# Patient Record
Sex: FEMALE | Race: ASIAN | NOT HISPANIC OR LATINO | Employment: FULL TIME | URBAN - METROPOLITAN AREA
[De-identification: names, ages, dates, MRNs, and addresses within clinical notes are randomized per-mention and may not be internally consistent; named-entity substitution may affect disease eponyms.]

---
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
## 2018-01-07 ENCOUNTER — HEALTH MAINTENANCE LETTER (OUTPATIENT)
Age: 18
End: 2018-01-07

## 2018-06-22 ENCOUNTER — OFFICE VISIT (OUTPATIENT)
Dept: OBGYN | Facility: CLINIC | Age: 18
End: 2018-06-22
Payer: COMMERCIAL

## 2018-06-22 VITALS
WEIGHT: 117 LBS | TEMPERATURE: 98.8 F | HEIGHT: 63 IN | BODY MASS INDEX: 20.73 KG/M2 | OXYGEN SATURATION: 100 % | DIASTOLIC BLOOD PRESSURE: 44 MMHG | SYSTOLIC BLOOD PRESSURE: 96 MMHG | HEART RATE: 61 BPM

## 2018-06-22 DIAGNOSIS — Z30.011 ENCOUNTER FOR INITIAL PRESCRIPTION OF CONTRACEPTIVE PILLS: Primary | ICD-10-CM

## 2018-06-22 PROCEDURE — 99213 OFFICE O/P EST LOW 20 MIN: CPT | Performed by: NURSE PRACTITIONER

## 2018-06-22 RX ORDER — DESOGESTREL AND ETHINYL ESTRADIOL 0.15-0.03
1 KIT ORAL DAILY
Qty: 84 TABLET | Refills: 3 | Status: SHIPPED | OUTPATIENT
Start: 2018-06-22 | End: 2019-07-10

## 2018-06-22 ASSESSMENT — PAIN SCALES - GENERAL: PAINLEVEL: NO PAIN (0)

## 2018-06-22 NOTE — PROGRESS NOTES
"  SUBJECTIVE:   Elzbieta Price is a 18 year old female who presents to clinic today for the following health issues:    Birth control counseling  Patient is currently using condoms for contraception. Patient would like to discuss available options for something additional that may be more reliable. Past medical, surgical, social, and family history are reviewed in chart and updated along with current medications and allergies. No contraindications to hormonal contraception. Declines STI screening or full preventative exam.     Problem list and histories reviewed & adjusted, as indicated.  Additional history: as documented    There is no problem list on file for this patient.    Past Surgical History:   Procedure Laterality Date     NO HISTORY OF SURGERY         Social History   Substance Use Topics     Smoking status: Never Smoker     Smokeless tobacco: Never Used     Alcohol use No     History reviewed. No pertinent family history.        Reviewed and updated as needed this visit by clinical staff  Tobacco  Allergies  Meds  Med Hx  Surg Hx  Fam Hx  Soc Hx      Reviewed and updated as needed this visit by Provider         ROS:  Constitutional, HEENT, cardiovascular, pulmonary, gi and gu systems are negative, except as otherwise noted.    OBJECTIVE:     BP 96/44  Pulse 61  Temp 98.8  F (37.1  C) (Oral)  Ht 5' 2.75\" (1.594 m)  Wt 117 lb (53.1 kg)  LMP 05/25/2018 (Approximate)  SpO2 100%  BMI 20.89 kg/m2  Body mass index is 20.89 kg/(m^2).  GENERAL: healthy, alert and no distress  RESP: lungs clear to auscultation - no rales, rhonchi or wheezes  CV: regular rate and rhythm, normal S1 S2, no S3 or S4, no murmur, click or rub, no peripheral edema and peripheral pulses strong  ABDOMEN: soft, nontender, no hepatosplenomegaly, no masses and bowel sounds normal  MS: no gross musculoskeletal defects noted, no edema  SKIN: no suspicious lesions or rashes  PSYCH: mentation appears normal, affect " normal/bright    ASSESSMENT/PLAN:   1. Encounter for initial prescription of contraceptive pills  Discussed options for contraception with patient including oral contraceptive pills, transdermal patches, vaginal ring, Depo Provera, Nexplanon. At this time she would like to try an oral contraceptive pill. We discussed when to start the pill, taking it at the same time every day, possible side effects she may experience, and use of barrier method to protect against STDs. Prescription sent. Plan pap smear and AFE in 1 year.  - desogestrel-ethinyl estradiol (APRI) 0.15-30 MG-MCG per tablet; Take 1 tablet by mouth daily  Dispense: 84 tablet; Refill: 3    RHONDA [facility]

## 2018-06-22 NOTE — MR AVS SNAPSHOT
"              After Visit Summary   6/22/2018    Elzbieta Price    MRN: [id number]           Patient Information     Date Of Birth          2000        Visit Information        Provider Department      6/22/2018 10:10 AM Lisa Parker APRN CNP [facility]        Today's Diagnoses     Encounter for initial prescription of contraceptive pills    -  1       Follow-ups after your visit        Who to contact     If you have questions or need follow up information about today's clinic visit or your schedule please contact [facility] directly at [phone].  Normal or non-critical lab and imaging results will be communicated to you by [employer]hart, letter or phone within 4 business days after the clinic has received the results. If you do not hear from us within 7 days, please contact the clinic through 2d2ct or phone. If you have a critical or abnormal lab result, we will notify you by phone as soon as possible.  Submit refill requests through WellnessFX or call your pharmacy and they will forward the refill request to us. Please allow 3 business days for your refill to be completed.          Additional Information About Your Visit        MyChart Information     WellnessFX gives you secure access to your electronic health record. If you see a primary care provider, you can also send messages to your care team and make appointments. If you have questions, please call your primary care clinic.  If you do not have a primary care provider, please call [phone] and they will assist you.        Care EveryWhere ID     This is your Care EveryWhere ID. This could be used by other organizations to access your [place] medical records  [id number]        Your Vitals Were     Pulse Temperature Height Last Period Pulse Oximetry BMI (Body Mass Index)    61 98.8  F (37.1  C) (Oral) 5' 2.75\" (1.594 m) 05/25/2018 (Approximate) 100% 20.89 kg/m2       Blood Pressure from Last 3 Encounters:   06/22/18 96/44 "   01/30/17 120/81   09/30/16 111/71    Weight from Last 3 Encounters:   06/22/18 117 lb (53.1 kg)   01/30/17 118 lb 11.2 oz (53.8 kg) (36 %)*   09/30/16 117 lb (53.1 kg) (34 %)*     * Growth percentiles are based on [facility] 2-20 Years data.              Today, you had the following     No orders found for display         Today's Medication Changes          These changes are accurate as of 6/22/18 10:16 AM.  If you have any questions, ask your nurse or doctor.               Start taking these medicines.        Dose/Directions    desogestrel-ethinyl estradiol 0.15-30 MG-MCG per tablet   Commonly known as:  APRI   Used for:  Encounter for initial prescription of contraceptive pills   Started by:  Lisa Parker APRN CNP        Dose:  1 tablet   Take 1 tablet by mouth daily   Quantity:  84 tablet   Refills:  3            Where to get your medicines      These medications were sent to [facility] [address] [place] [address] AT SEC of [address] [address]     Phone:  [phone]     desogestrel-ethinyl estradiol 0.15-30 MG-MCG per tablet                Primary Care Provider Office Phone # Fax #    [facility] [phone]       [address]  [facility] [address]        Equal Access to Services     [facility] AH: Hadii aad ku hadasho Sosoniali, waaxda luqadaha, qaybta kaalmada adeegyada, jennifer orellana. So [facility] [phone].    ATENCIÓN: Si habla español, tiene a aguilar disposición servicios gratuitos de asistencia lingüística. Rocio al [phone].    We comply with applicable federal civil rights laws and [place] laws. We do not discriminate on the basis of race, color, national origin, age, disability, sex, sexual orientation, or gender identity.            Thank you!     Thank you for choosing [facility]  for your care. Our goal is always to provide you with  excellent care. Hearing back from our patients is one way we can continue to improve our services. Please take a few minutes to complete the written survey that you may receive in the mail after your visit with us. Thank you!             Your Updated Medication List - Protect others around you: Learn how to safely use, store and throw away your medicines at www.disposemymeds.org.          This list is accurate as of 6/22/18 10:16 AM.  Always use your most recent med list.                   Brand Name Dispense Instructions for use Diagnosis    desogestrel-ethinyl estradiol 0.15-30 MG-MCG per tablet    APRI    84 tablet    Take 1 tablet by mouth daily    Encounter for initial prescription of contraceptive pills

## 2018-06-22 NOTE — PROGRESS NOTES
"   SUBJECTIVE:   CC: Elzbieta Price is an 18 year old woman who presents for preventive health visit.     Physical   Annual:     Getting at least 3 servings of Calcium per day::  Yes    Bi-annual eye exam::  Yes    Dental care twice a year::  NO    Sleep apnea or symptoms of sleep apnea::  None    Diet::  Regular (no restrictions)    Frequency of exercise::  None    Taking medications regularly::  Yes    Medication side effects::  None    Additional concerns today::  No            {Outside tests to abstract? :733597}    {additional problems to add (Optional):503426}    Today's PHQ-2 Score:   PHQ-2 ( 1999 [employer]) 6/21/2018   Q1: Little interest or pleasure in doing things 0   Q2: Feeling down, depressed or hopeless 0   PHQ-2 Score 0   Q1: Little interest or pleasure in doing things Not at all   Q2: Feeling down, depressed or hopeless Not at all   PHQ-2 Score 0       Abuse: Current or Past(Physical, Sexual or Emotional)- {YES/NO/NA:398792}  Do you feel safe in your environment - {YES/NO/NA:778431}    Social History   Substance Use Topics     Smoking status: Never Smoker     Smokeless tobacco: Never Used     Alcohol use No     Alcohol Use 6/21/2018   If you drink alcohol do you typically have greater than 3 drinks per day OR greater than 7 drinks per week? No   {add AUDIT responses (Optional) (A score of 7 for adult men is an indication of hazardous drinking; a score of 8 or more is an indication of an alcohol use disorder.  A score of 7 or more for adult women is an indication of hazardous drinking or an alchohol use disorder):293917}    Reviewed orders with patient.  Reviewed health maintenance and updated orders accordingly - {Yes/No:815625::\"Yes\"}  {Chronicprobdata (Optional):422689}    {Mammo Decision Support (Optional):109834}    Pertinent mammograms are reviewed under the imaging tab.  History of abnormal Pap smear: {PAP HX:254476}    Reviewed and updated as needed this visit by clinical staff         Reviewed and " "updated as needed this visit by Provider        {HISTORY OPTIONS (Optional):215914}    Review of Systems  {FEMALE PREVENTATIVE ROS:133691}     OBJECTIVE:   There were no vitals taken for this visit.  Physical Exam  {Exam Choices:521916}    ASSESSMENT/PLAN:   {Diag Picklist:666765}    COUNSELING:  {FEMALE COUNSELING MESSAGES:886651::\"Reviewed preventive health counseling, as reflected in patient instructions\"}    {BP Counseling- Complete if BP >= 120/80  (Optional):277347}     reports that she has never smoked. She has never used smokeless tobacco.  {Tobacco Cessation -- Complete if patient is a smoker (Optional):177772}  Estimated body mass index is 21.36 kg/(m^2) as calculated from the following:    Height as of 1/30/17: 5' 2.5\" (1.588 m).    Weight as of 1/30/17: 118 lb 11.2 oz (53.8 kg).   {Weight Management Plan (ACO) Complete if BMI is abnormal-  Ages 18-64  BMI >24.9.  Age 65+ with BMI <23 or >30 (Optional):023804}    Counseling Resources:  ATP IV Guidelines  Pooled Cohorts Equation Calculator  Breast Cancer Risk Calculator  FRAX Risk Assessment  ICSI Preventive Guidelines  Dietary Guidelines for Americans, 2010  USDA's MyPlate  ASA Prophylaxis  Lung CA Screening    RHONDA Suero [facility]  "

## 2018-06-22 NOTE — NURSING NOTE
"Chief Complaint   Patient presents with     Contraception       Initial BP 96/44  Pulse 61  Temp 98.8  F (37.1  C) (Oral)  Ht 5' 2.75\" (1.594 m)  Wt 117 lb (53.1 kg)  LMP 05/25/2018 (Approximate)  SpO2 100%  BMI 20.89 kg/m2 Estimated body mass index is 20.89 kg/(m^2) as calculated from the following:    Height as of this encounter: 5' 2.75\" (1.594 m).    Weight as of this encounter: 117 lb (53.1 kg)..  BP completed using cuff size: elias Mckeon CMA    "

## 2018-11-30 ENCOUNTER — ALLIED HEALTH/NURSE VISIT (OUTPATIENT)
Dept: NURSING | Facility: CLINIC | Age: 18
End: 2018-11-30
Payer: COMMERCIAL

## 2018-11-30 DIAGNOSIS — Z23 NEED FOR PROPHYLACTIC VACCINATION AND INOCULATION AGAINST INFLUENZA: Primary | ICD-10-CM

## 2018-11-30 PROCEDURE — 90471 IMMUNIZATION ADMIN: CPT

## 2018-11-30 PROCEDURE — 90686 IIV4 VACC NO PRSV 0.5 ML IM: CPT

## 2018-11-30 PROCEDURE — 99207 ZZC NO CHARGE NURSE ONLY: CPT

## 2018-11-30 NOTE — PROGRESS NOTES

## 2018-11-30 NOTE — MR AVS SNAPSHOT
After Visit Summary   11/30/2018    Elzbieta Price    MRN: [id number]           Patient Information     Date Of Birth          2000        Visit Information        Provider Department      11/30/2018 12:40 PM AN ANCILLARY [facility]        Today's Diagnoses     Need for prophylactic vaccination and inoculation against influenza    -  1       Follow-ups after your visit        Who to contact     If you have questions or need follow up information about today's clinic visit or your schedule please contact [facility] directly at [phone].  Normal or non-critical lab and imaging results will be communicated to you by [employer], letter or phone within 4 business days after the clinic has received the results. If you do not hear from us within 7 days, please contact the clinic through [employer] or phone. If you have a critical or abnormal lab result, we will notify you by phone as soon as possible.  Submit refill requests through [employer] or call your pharmacy and they will forward the refill request to us. Please allow 3 business days for your refill to be completed.          Additional Information About Your Visit        MyChart Information     [employer] gives you secure access to your electronic health record. If you see a primary care provider, you can also send messages to your care team and make appointments. If you have questions, please call your primary care clinic.  If you do not have a primary care provider, please call [phone] and they will assist you.        Care EveryWhere ID     This is your Care EveryWhere ID. This could be used by other organizations to access your [place] medical records  [id number]         Blood Pressure from Last 3 Encounters:   06/22/18 96/44   01/30/17 120/81   09/30/16 111/71    Weight from Last 3 Encounters:   06/22/18 117 lb (53.1 kg)   01/30/17 118 lb 11.2 oz (53.8 kg) (36 %)*   09/30/16 117 lb (53.1 kg) (34 %)*     * Growth percentiles  are based on [facility] 2-20 Years data.              We Performed the Following     FLU VACCINE, SPLIT VIRUS, IM (QUADRIVALENT) [50425]- >3 YRS     Vaccine Administration, Initial [26094]        Primary Care Provider Office Phone # Fax #    [facility] [phone] [address]  [facility] [address]        Equal Access to Services     MARY MADISON : Hadii aad ku hadasho Soomaali, waaxda luqadaha, qaybta kaalmada adeegyada, waxay idiin hayaan adeeg kharash lasylvia . So [facility] [phone].    ATENCIÓN: Si habla español, tiene a aguilar disposición servicios gratuitos de asistencia lingüística. Rocio al [phone].    We comply with applicable federal civil rights laws and [place] laws. We do not discriminate on the basis of race, color, national origin, age, disability, sex, sexual orientation, or gender identity.            Thank you!     Thank you for choosing [facility]  for your care. Our goal is always to provide you with excellent care. Hearing back from our patients is one way we can continue to improve our services. Please take a few minutes to complete the written survey that you may receive in the mail after your visit with us. Thank you!             Your Updated Medication List - Protect others around you: Learn how to safely use, store and throw away your medicines at www.disposemymeds.org.          This list is accurate as of 11/30/18 12:45 PM.  Always use your most recent med list.                   Brand Name Dispense Instructions for use Diagnosis    desogestrel-ethinyl estradiol 0.15-30 MG-MCG tablet    APRI    84 tablet    Take 1 tablet by mouth daily    Encounter for initial prescription of contraceptive pills

## 2019-01-29 NOTE — PROGRESS NOTES
"  SUBJECTIVE:   Elzbieta Price is a 18 year old female who presents to clinic today for the following health issues:    Irregular Menses    Patient is on a combined oral contraceptive pill. In her last cycle, did miss one pill and took it the next day. Menses was due last week. Monday-Thursday had spotting (which is not normal for her) of darker old blood. Thursday, had slight increase in cramping starting from when she woke up. In the afternoon, was urinating and blood was bright red with wiping and noticed something in the toilet, picked it out and it was hard, pink to tan in color. Approximately the size of a quarter. After that again blood was spotting and dark in color. Started her new pack of pills Sunday as normal. No further bleeding.  2 negative home pregnancy tests. Confident she is not pregnant. No abnormal urinary or vaginal symptoms, pelvic pain, fevers, nausea, back pain. No STI concern.    Problem list and histories reviewed & adjusted, as indicated.  Additional history: as documented    There is no problem list on file for this patient.    Past Surgical History:   Procedure Laterality Date     NO HISTORY OF SURGERY         Social History     Tobacco Use     Smoking status: Never Smoker     Smokeless tobacco: Never Used   Substance Use Topics     Alcohol use: No     History reviewed. No pertinent family history.        Reviewed and updated as needed this visit by clinical staff       Reviewed and updated as needed this visit by Provider         ROS:  Constitutional, HEENT, cardiovascular, pulmonary, gi and gu systems are negative, except as otherwise noted.    OBJECTIVE:     /75 (BP Location: Right arm, Patient Position: Sitting, Cuff Size: Adult Regular)   Pulse 76   Temp 97.3  F (36.3  C) (Oral)   Ht 1.594 m (5' 2.75\")   Wt 53.7 kg (118 lb 6.4 oz)   LMP 01/21/2019 (Exact Date)   SpO2 99%   BMI 21.14 kg/m    Body mass index is 21.14 kg/m .  GENERAL: healthy, alert and no distress  RESP: lungs " clear to auscultation - no rales, rhonchi or wheezes  CV: regular rate and rhythm, normal S1 S2, no S3 or S4, no murmur, click or rub, no peripheral edema and peripheral pulses strong  ABDOMEN: soft, nontender, no hepatosplenomegaly, no masses and bowel sounds normal   (female): normal female external genitalia, normal urethral meatus, vaginal mucosa, normal cervix/adnexa/uterus without masses or discharge  MS: no gross musculoskeletal defects noted, no edema  SKIN: no suspicious lesions or rashes  PSYCH: mentation appears normal, affect normal/bright    ASSESSMENT/PLAN:   1. Irregular menstrual cycle  We discussed her lack of a withdrawal bleed, but did have spotting and discussed possible etiologies of the tissue/clot she passed. Reassured by the normal exam. Declines STI or pregnancy testing. Will continue pills as prescribed and patient will monitor her next few cycles. If she passes more tissue/clots, will call me and plan pelvic ultrasound for further evaluation. Discussed that absence of a withdrawal bleed is not necessarily abnormal while on oral contraceptive pills. Patient is given an opportunity to ask questions and have them answered.    RHONDA Suero [facility]

## 2019-01-30 ENCOUNTER — OFFICE VISIT (OUTPATIENT)
Dept: OBGYN | Facility: CLINIC | Age: 19
End: 2019-01-30
Payer: COMMERCIAL

## 2019-01-30 VITALS
OXYGEN SATURATION: 99 % | HEART RATE: 76 BPM | BODY MASS INDEX: 20.98 KG/M2 | TEMPERATURE: 97.3 F | WEIGHT: 118.4 LBS | DIASTOLIC BLOOD PRESSURE: 75 MMHG | SYSTOLIC BLOOD PRESSURE: 120 MMHG | HEIGHT: 63 IN

## 2019-01-30 DIAGNOSIS — N92.6 IRREGULAR MENSTRUAL CYCLE: ICD-10-CM

## 2019-01-30 PROCEDURE — 99213 OFFICE O/P EST LOW 20 MIN: CPT | Performed by: NURSE PRACTITIONER

## 2019-01-30 ASSESSMENT — PAIN SCALES - GENERAL: PAINLEVEL: NO PAIN (0)

## 2019-01-30 ASSESSMENT — MIFFLIN-ST. JEOR: SCORE: 1272.22

## 2019-07-10 ENCOUNTER — MYC REFILL (OUTPATIENT)
Dept: OBGYN | Facility: CLINIC | Age: 19
End: 2019-07-10

## 2019-07-10 DIAGNOSIS — Z30.011 ENCOUNTER FOR INITIAL PRESCRIPTION OF CONTRACEPTIVE PILLS: ICD-10-CM

## 2019-07-11 RX ORDER — DESOGESTREL AND ETHINYL ESTRADIOL 0.15-0.03
1 KIT ORAL DAILY
Qty: 84 TABLET | Refills: 1 | Status: SHIPPED | OUTPATIENT
Start: 2019-07-11 | End: 2019-12-06

## 2019-07-11 NOTE — TELEPHONE ENCOUNTER
"Contraceptives Protocol Passed   desogestrel-ethinyl estradiol (APRI) 0.15-30 MG-MCG tablet   Rerun Protocol (7/10/2019 9:15 PM)      Patient is not a current smoker if age is 35 or older         Recent (12 mo) or future (30 days) visit within the authorizing provider's specialty      Patient had office visit in the last 12 months or has a visit in the next 30 days with authorizing provider or within the authorizing provider's specialty.  See \"Patient Info\" tab in inbasket, or \"Choose Columns\" in Meds & Orders section of the refill encounter.              Medication is active on med list         No active pregnancy on record         No positive pregnancy test in past 12 months        Prescription approved per [facility] Refill Protocol.    Gilma Ferro RN    "

## 2019-07-25 DIAGNOSIS — Z30.011 ENCOUNTER FOR INITIAL PRESCRIPTION OF CONTRACEPTIVE PILLS: ICD-10-CM

## 2019-07-25 RX ORDER — DESOGESTREL AND ETHINYL ESTRADIOL 0.15-0.03
KIT ORAL
Qty: 84 TABLET | Refills: 0 | OUTPATIENT
Start: 2019-07-25

## 2019-07-25 NOTE — TELEPHONE ENCOUNTER
Contraceptives Protocol Passed   desogestrel-ethinyl estradiol (APRI) 0.15-30 MG-MCG tablet           Last written prescription date: 7/11/2019       Last fill quantity: 84 tablets, # refills:1       Last office visit: 1/30/2019      Future office visit: Not scheduled- will be done for annual exam at end of January.        Is this a controlled substance?  No     Patient should need only to call pharmacy, refill remains.   Will notify via [employer] message. RN to deny request for refill at this time.     Candy Marks RN on 7/25/2019 at 10:19 AM

## 2019-08-14 ENCOUNTER — TELEPHONE (OUTPATIENT)
Dept: OBGYN | Facility: CLINIC | Age: 19
End: 2019-08-14

## 2019-08-14 NOTE — TELEPHONE ENCOUNTER
Reason for call was in appt notes, needed to be rescheduled with FP. Patient scheduled with Ankita for a short visit.    Mery Sosa  Women's Health

## 2019-08-14 NOTE — TELEPHONE ENCOUNTER
Patient states she is returning a call to Mery, writer unable to find previous encounter. Please advise

## 2019-08-15 NOTE — PROGRESS NOTES
"Subjective     Elzbieta Price is a 19 year old female who presents to clinic today for the following health issues:    HPI   Lump in armpit      Duration: 3-4 years    Description (location/character/radiation): under right arm    Intensity:  5/10    Accompanying signs and symptoms: pain, swelling     History (similar episodes/previous evaluation): None    Precipitating or alleviating factors: None    Therapies tried and outcome: None     Reports lump under right arm pit has not changed in size in the past 3-4 years, has remained tender at times. No drainage. Denies fever, weight loss, illness.       Patient Active Problem List   Diagnosis     Irregular menstrual cycle     Past Surgical History:   Procedure Laterality Date     NO HISTORY OF SURGERY         Social History     Tobacco Use     Smoking status: Never Smoker     Smokeless tobacco: Never Used   Substance Use Topics     Alcohol use: No     History reviewed. No pertinent family history.      Current Outpatient Medications   Medication Sig Dispense Refill     desogestrel-ethinyl estradiol (APRI) 0.15-30 MG-MCG tablet Take 1 tablet by mouth daily 84 tablet 1     No Known Allergies    Reviewed and updated as needed this visit by Provider         Review of Systems   Constitutional: Negative for appetite change, chills, fatigue, fever and unexpected weight change.   Respiratory: Negative for cough and shortness of breath.    Cardiovascular: Negative for chest pain.   Breasts:  Negative for tenderness, breast mass and discharge.            Objective    /62   Pulse 89   Temp 98.3  F (36.8  C) (Oral)   Resp 18   Ht 1.594 m (5' 2.75\")   Wt 58.1 kg (128 lb)   BMI 22.86 kg/m    Body mass index is 22.86 kg/m .  Physical Exam   Constitutional: She is oriented to person, place, and time. Vital signs are normal. She appears well-developed and well-nourished.   HENT:   Head: Normocephalic.   Cardiovascular: Normal rate, regular rhythm and normal heart sounds. "   Pulmonary/Chest: Effort normal and breath sounds normal.   Lymphadenopathy:     She has axillary adenopathy.   Right axillary adenopathy, soft movable, measuring approx 4 cm in diameter, mildly tender to palpation   Neurological: She is alert and oriented to person, place, and time.   Skin: Skin is warm and dry.   Psychiatric: She has a normal mood and affect. Her behavior is normal. Thought content normal.   Vitals reviewed.                 Assessment & Plan     1. Axillary adenopathy  - will get ultrasound for further evaluation given duration of symptoms.   - US Breast Right Limited 1-3 Quadrants; Future    2. Need for HPV vaccination  - HUMAN PAPILLOMA VIRUS (GARDASIL 9) VACCINE [34712]    3. Need for vaccination  -      ADMIN VACCINE, FIRST [06377]  - HEPATITIS A VACCINE, ADULT  [92125]  - HUMAN PAPILLOMA VIRUS (GARDASIL 9) VACCINE [21436]  - 1st  Administration  [66208]  - Each additional admin.  (Right click and add QUANTITY)  [67453]     Due for PE and Pap. Encouraged to make future appt to have this done.   Return in about 1 week (around 8/30/2019) for worsening symptoms or failure to improve.    Ankita Cochran NP  [facility]

## 2019-08-23 ENCOUNTER — OFFICE VISIT (OUTPATIENT)
Dept: FAMILY MEDICINE | Facility: CLINIC | Age: 19
End: 2019-08-23
Payer: COMMERCIAL

## 2019-08-23 VITALS
DIASTOLIC BLOOD PRESSURE: 62 MMHG | TEMPERATURE: 98.3 F | HEIGHT: 63 IN | WEIGHT: 128 LBS | HEART RATE: 89 BPM | BODY MASS INDEX: 22.68 KG/M2 | SYSTOLIC BLOOD PRESSURE: 107 MMHG | RESPIRATION RATE: 18 BRPM

## 2019-08-23 DIAGNOSIS — R59.0 AXILLARY ADENOPATHY: Primary | ICD-10-CM

## 2019-08-23 DIAGNOSIS — Z23 NEED FOR HPV VACCINATION: ICD-10-CM

## 2019-08-23 DIAGNOSIS — Z23 NEED FOR VACCINATION: ICD-10-CM

## 2019-08-23 PROCEDURE — 90472 IMMUNIZATION ADMIN EACH ADD: CPT | Performed by: NURSE PRACTITIONER

## 2019-08-23 PROCEDURE — 90651 9VHPV VACCINE 2/3 DOSE IM: CPT | Performed by: NURSE PRACTITIONER

## 2019-08-23 PROCEDURE — 99213 OFFICE O/P EST LOW 20 MIN: CPT | Mod: 25 | Performed by: NURSE PRACTITIONER

## 2019-08-23 PROCEDURE — 90632 HEPA VACCINE ADULT IM: CPT | Performed by: NURSE PRACTITIONER

## 2019-08-23 PROCEDURE — 90471 IMMUNIZATION ADMIN: CPT | Performed by: NURSE PRACTITIONER

## 2019-08-23 ASSESSMENT — MIFFLIN-ST. JEOR: SCORE: 1310.76

## 2019-08-23 ASSESSMENT — ENCOUNTER SYMPTOMS
BREAST MASS: 0
FATIGUE: 0
SHORTNESS OF BREATH: 0
FEVER: 0
UNEXPECTED WEIGHT CHANGE: 0
APPETITE CHANGE: 0
COUGH: 0
CHILLS: 0

## 2019-08-23 NOTE — NURSING NOTE
Screening Questionnaire for Adult Immunization    Are you sick today?   No   Do you have allergies to medications, food, a vaccine component or latex?   No   Have you ever had a serious reaction after receiving a vaccination?   No   Do you have a long-term health problem with heart disease, lung disease, asthma, kidney disease, metabolic disease (e.g. diabetes), anemia, or other blood disorder?   No   Do you have cancer, leukemia, HIV/AIDS, or any other immune system problem?   No   In the past 3 months, have you taken medications that affect  your immune system, such as prednisone, other steroids, or anticancer drugs; drugs for the treatment of rheumatoid arthritis, Crohn s disease, or psoriasis; or have you had radiation treatments?   No   Have you had a seizure, or a brain or other nervous system problem?   No   During the past year, have you received a transfusion of blood or blood     products, or been given immune (gamma) globulin or antiviral drug?   No   For women: Are you pregnant or is there a chance you could become        pregnant during the next month?   No   Have you received any vaccinations in the past 4 weeks?   No     Immunization questionnaire answers were all negative.        Per orders of Dr. Ankita Cochran, injection of Hep a and hpv  given by Theodora Burgos CMA. Patient instructed to remain in clinic for 15 minutes afterwards, and to report any adverse reaction to me immediately.       Screening performed by Theodora Burgos CMA on 8/23/2019 at 2:54 PM.

## 2019-08-23 NOTE — NURSING NOTE
"Chief Complaint   Patient presents with     Mass     Lump in armpit right      Health Maintenance     Pended orders, Preventive care, Pap, PHQ2       Initial /62   Pulse 89   Temp 98.3  F (36.8  C) (Oral)   Resp 18   Ht 1.594 m (5' 2.75\")   Wt 58.1 kg (128 lb)   BMI 22.86 kg/m   Estimated body mass index is 22.86 kg/m  as calculated from the following:    Height as of this encounter: 1.594 m (5' 2.75\").    Weight as of this encounter: 58.1 kg (128 lb).    Theodora Burgos, CMA    "

## 2019-08-27 ENCOUNTER — ANCILLARY PROCEDURE (OUTPATIENT)
Dept: ULTRASOUND IMAGING | Facility: CLINIC | Age: 19
End: 2019-08-27
Attending: NURSE PRACTITIONER
Payer: COMMERCIAL

## 2019-08-27 DIAGNOSIS — R59.0 AXILLARY ADENOPATHY: ICD-10-CM

## 2019-08-27 PROCEDURE — 76882 US LMTD JT/FCL EVL NVASC XTR: CPT | Mod: RT | Performed by: RADIOLOGY

## 2019-12-06 DIAGNOSIS — Z30.011 ENCOUNTER FOR INITIAL PRESCRIPTION OF CONTRACEPTIVE PILLS: ICD-10-CM

## 2019-12-06 RX ORDER — DESOGESTREL AND ETHINYL ESTRADIOL 0.15-0.03
KIT ORAL
Qty: 84 TABLET | Refills: 0 | Status: SHIPPED | OUTPATIENT
Start: 2019-12-06 | End: 2020-01-29

## 2019-12-06 NOTE — TELEPHONE ENCOUNTER
"Contraceptives Protocol Passed   ISIBLOOM 0.15-30 MG-MCG tablet [Pharmacy Med Name: ISIBLOOM 0.15MG-0.03MG TABLETS 28S]   Rerun Protocol (12/6/2019 11:01 AM)      Patient is not a current smoker if age is 35 or older         Recent (12 mo) or future (30 days) visit within the authorizing provider's specialty      Patient has had an office visit with the authorizing provider or a provider within the authorizing providers department within the previous 12 mos or has a future within next 30 days. See \"Patient Info\" tab in inbasket, or \"Choose Columns\" in Meds & Orders section of the refill encounter.              Medication is active on med list         No active pregnancy on record         No positive pregnancy test in past 12 months        Prescription approved per [facility] Refill Protocol.  Pt is due for an annual exam in January, 2020.    Gilma Ferro RN    "

## 2020-01-27 DIAGNOSIS — Z30.011 ENCOUNTER FOR INITIAL PRESCRIPTION OF CONTRACEPTIVE PILLS: ICD-10-CM

## 2020-01-28 NOTE — TELEPHONE ENCOUNTER
"Contraceptives Protocol Passed   ISIBLOOM 0.15-30 MG-MCG tablet [Pharmacy Med Name: ISIBLOOM 0.15MG-0.03MG TABLETS 28S]   Rerun Protocol (1/28/2020 7:26 AM)      Patient is not a current smoker if age is 35 or older         Recent (12 mo) or future (30 days) visit within the authorizing provider's specialty      Patient has had an office visit with the authorizing provider or a provider within the authorizing providers department within the previous 12 mos or has a future within next 30 days. See \"Patient Info\" tab in inbasket, or \"Choose Columns\" in Meds & Orders section of the refill encounter.              Medication is active on med list         No active pregnancy on record         No positive pregnancy test in past 12 months          Last Written Prescription Date:  12/6/2019  Last Fill Quantity: 84,   # refills: 0  Last Office Visit: 1/30/2019  Future Office visit:  Nothing scheduled. Patient is due for her annual exam on or after 1/30/2020. RN sent a mychart reminder for patient to schedule exam and once scheduled RN will send a hoa refill.     Candy Marks RN on 1/28/2020 at 8:32 AM          "

## 2020-01-29 RX ORDER — DESOGESTREL AND ETHINYL ESTRADIOL 0.15-0.03
KIT ORAL
Qty: 28 TABLET | Refills: 0 | Status: SHIPPED | OUTPATIENT
Start: 2020-01-29 | End: 2020-02-04

## 2020-01-29 NOTE — TELEPHONE ENCOUNTER
Pt is scheduled with RHONDA Moise CNP, on 2/4/2020, for an annual physical.    Medication is being filled for 1 time refill only due to:  Patient needs to be seen because it has been more than one year since last visit.    Gilma Ferro RN

## 2020-02-04 ENCOUNTER — OFFICE VISIT (OUTPATIENT)
Dept: OBGYN | Facility: CLINIC | Age: 20
End: 2020-02-04
Payer: COMMERCIAL

## 2020-02-04 VITALS
HEIGHT: 62 IN | DIASTOLIC BLOOD PRESSURE: 84 MMHG | HEART RATE: 74 BPM | WEIGHT: 137.2 LBS | TEMPERATURE: 98.5 F | BODY MASS INDEX: 25.25 KG/M2 | SYSTOLIC BLOOD PRESSURE: 128 MMHG | OXYGEN SATURATION: 98 %

## 2020-02-04 DIAGNOSIS — Z01.419 ENCOUNTER FOR GYNECOLOGICAL EXAMINATION WITHOUT ABNORMAL FINDING: Primary | ICD-10-CM

## 2020-02-04 DIAGNOSIS — Z30.41 ENCOUNTER FOR SURVEILLANCE OF CONTRACEPTIVE PILLS: ICD-10-CM

## 2020-02-04 DIAGNOSIS — Z11.3 SCREEN FOR STD (SEXUALLY TRANSMITTED DISEASE): ICD-10-CM

## 2020-02-04 PROCEDURE — 87491 CHLMYD TRACH DNA AMP PROBE: CPT | Performed by: NURSE PRACTITIONER

## 2020-02-04 PROCEDURE — 99395 PREV VISIT EST AGE 18-39: CPT | Performed by: NURSE PRACTITIONER

## 2020-02-04 PROCEDURE — 87591 N.GONORRHOEAE DNA AMP PROB: CPT | Performed by: NURSE PRACTITIONER

## 2020-02-04 PROCEDURE — G0145 SCR C/V CYTO,THINLAYER,RESCR: HCPCS | Performed by: NURSE PRACTITIONER

## 2020-02-04 RX ORDER — DESOGESTREL AND ETHINYL ESTRADIOL 0.15-0.03
1 KIT ORAL DAILY
Qty: 84 TABLET | Refills: 3 | Status: SHIPPED | OUTPATIENT
Start: 2020-02-04 | End: 2021-05-06

## 2020-02-04 ASSESSMENT — MIFFLIN-ST. JEOR: SCORE: 1344.56

## 2020-02-04 ASSESSMENT — PAIN SCALES - GENERAL: PAINLEVEL: NO PAIN (0)

## 2020-02-04 NOTE — PROGRESS NOTES
SUBJECTIVE:   CC: Elzbieta Price is an 19 year old woman who presents for preventive health visit.     Healthy Habits:     Getting at least 3 servings of Calcium per day:  Yes    Bi-annual eye exam:  Yes    Dental care twice a year:  Yes    Sleep apnea or symptoms of sleep apnea:  None    Diet:  Regular (no restrictions)    Frequency of exercise:  1 day/week    Duration of exercise:  15-30 minutes    Taking medications regularly:  Yes    Medication side effects:  Not applicable    PHQ-2 Total Score: 0    Additional concerns today:  No        Today's PHQ-2 Score:   PHQ-2 ( 1999 Pfizer) 2/3/2020   Q1: Little interest or pleasure in doing things 0   Q2: Feeling down, depressed or hopeless 0   PHQ-2 Score 0   Q1: Little interest or pleasure in doing things Not at all   Q2: Feeling down, depressed or hopeless Not at all   PHQ-2 Score 0       Abuse: Current or Past(Physical, Sexual or Emotional)- No  Do you feel safe in your environment? Yes        Social History     Tobacco Use     Smoking status: Never Smoker     Smokeless tobacco: Never Used   Substance Use Topics     Alcohol use: No         Alcohol Use 2/3/2020   Prescreen: >3 drinks/day or >7 drinks/week? No   No flowsheet data found.    Reviewed orders with patient.  Reviewed health maintenance and updated orders accordingly - Yes  Patient Active Problem List   Diagnosis     Irregular menstrual cycle     Past Surgical History:   Procedure Laterality Date     NO HISTORY OF SURGERY         Social History     Tobacco Use     Smoking status: Never Smoker     Smokeless tobacco: Never Used   Substance Use Topics     Alcohol use: No     History reviewed. No pertinent family history.        Mammogram not appropriate for this patient based on age.    Pertinent mammograms are reviewed under the imaging tab.  History of abnormal Pap smear: NO - age 21-29 PAP every 3 years recommended     Reviewed and updated as needed this visit by clinical staff  Tobacco  Allergies  Meds  Med  "Hx  Surg Hx  Fam Hx  Soc Hx        Reviewed and updated as needed this visit by Provider        Past Medical History:   Diagnosis Date     NO ACTIVE PROBLEMS       Past Surgical History:   Procedure Laterality Date     NO HISTORY OF SURGERY         Review of Systems  CONSTITUTIONAL: NEGATIVE for fever, chills, change in weight  INTEGUMENTARU/SKIN: NEGATIVE for worrisome rashes, moles or lesions  EYES: NEGATIVE for vision changes or irritation  ENT: NEGATIVE for ear, mouth and throat problems  RESP: NEGATIVE for significant cough or SOB  BREAST: NEGATIVE for masses, tenderness or discharge  CV: NEGATIVE for chest pain, palpitations or peripheral edema  GI: NEGATIVE for nausea, abdominal pain, heartburn, or change in bowel habits  : NEGATIVE for unusual urinary or vaginal symptoms. Periods are regular.  MUSCULOSKELETAL: NEGATIVE for significant arthralgias or myalgia  NEURO: NEGATIVE for weakness, dizziness or paresthesias  PSYCHIATRIC: NEGATIVE for changes in mood or affect     OBJECTIVE:   /84 (BP Location: Right arm, Patient Position: Sitting, Cuff Size: Adult Regular)   Pulse 74   Temp 98.5  F (36.9  C) (Tympanic)   Ht 1.581 m (5' 2.25\")   Wt 62.2 kg (137 lb 3.2 oz)   LMP 01/14/2020 (Approximate)   SpO2 98%   BMI 24.89 kg/m    Physical Exam  GENERAL: healthy, alert and no distress  EYES: Eyes grossly normal to inspection, PERRL and conjunctivae and sclerae normal  HENT: ear canals and TM's normal, nose and mouth without ulcers or lesions  NECK: no adenopathy, no asymmetry, masses, or scars and thyroid normal to palpation  RESP: lungs clear to auscultation - no rales, rhonchi or wheezes  BREAST: normal without masses, tenderness or nipple discharge and no palpable axillary masses or adenopathy  CV: regular rate and rhythm, normal S1 S2, no S3 or S4, no murmur, click or rub, no peripheral edema and peripheral pulses strong  ABDOMEN: soft, nontender, no hepatosplenomegaly, no masses and bowel sounds " "normal   (female): normal female external genitalia, normal urethral meatus, vaginal mucosa pink, moist, well rugated, and normal cervix/adnexa/uterus without masses or discharge  MS: no gross musculoskeletal defects noted, no edema  SKIN: no suspicious lesions or rashes  NEURO: Normal strength and tone, mentation intact and speech normal  PSYCH: mentation appears normal, affect normal/bright    ASSESSMENT/PLAN:   1. Encounter for gynecological examination without abnormal finding  - Pap imaged thin layer screen only - recommended age 21 - 24 years    2. Encounter for surveillance of contraceptive pills  - desogestrel-ethinyl estradiol (ISIBLOOM) 0.15-30 MG-MCG tablet; Take 1 tablet by mouth daily  Dispense: 84 tablet; Refill: 3    3. Screen for STD (sexually transmitted disease)  - Chlamydia trachomatis PCR  - Neisseria gonorrhoeae PCR    COUNSELING:  Reviewed preventive health counseling, as reflected in patient instructions  Special attention given to:        Regular exercise       Healthy diet/nutrition       Contraception       Safe sex practices/STD prevention       HIV screeninx in teen years, 1x in adult years, and at intervals if high risk    Estimated body mass index is 24.89 kg/m  as calculated from the following:    Height as of this encounter: 1.581 m (5' 2.25\").    Weight as of this encounter: 62.2 kg (137 lb 3.2 oz).         reports that she has never smoked. She has never used smokeless tobacco.      Counseling Resources:  ATP IV Guidelines  Pooled Cohorts Equation Calculator  Breast Cancer Risk Calculator  FRAX Risk Assessment  ICSI Preventive Guidelines  Dietary Guidelines for Americans,   USDA's MyPlate  ASA Prophylaxis  Lung CA Screening    RHONDA Suero CNP  [facility]  "

## 2020-02-05 LAB
C TRACH DNA SPEC QL NAA+PROBE: NEGATIVE
N GONORRHOEA DNA SPEC QL NAA+PROBE: NEGATIVE
SPECIMEN SOURCE: NORMAL
SPECIMEN SOURCE: NORMAL

## 2020-02-06 LAB
COPATH REPORT: NORMAL
PAP: NORMAL

## 2020-03-10 ENCOUNTER — HEALTH MAINTENANCE LETTER (OUTPATIENT)
Age: 20
End: 2020-03-10

## 2020-03-19 ENCOUNTER — E-VISIT (OUTPATIENT)
Dept: OBGYN | Facility: CLINIC | Age: 20
End: 2020-03-19
Payer: COMMERCIAL

## 2020-03-19 DIAGNOSIS — Z53.9 ERRONEOUS ENCOUNTER--DISREGARD: Primary | ICD-10-CM

## 2020-03-23 NOTE — TELEPHONE ENCOUNTER
We would need to gather more information to adequately address her concern. I would recommend she plan a telephone visit with a family medicine provider. I will not bill for the E-visit. Lisa ELLIS CNP    This encounter was opened in error. Please disregard.

## 2020-12-27 ENCOUNTER — HEALTH MAINTENANCE LETTER (OUTPATIENT)
Age: 20
End: 2020-12-27

## 2021-03-06 ENCOUNTER — HEALTH MAINTENANCE LETTER (OUTPATIENT)
Age: 21
End: 2021-03-06

## 2021-05-06 DIAGNOSIS — Z30.41 ENCOUNTER FOR SURVEILLANCE OF CONTRACEPTIVE PILLS: ICD-10-CM

## 2021-05-06 NOTE — TELEPHONE ENCOUNTER
"Requested Prescriptions   Pending Prescriptions Disp Refills     desogestrel-ethinyl estradiol (ISIBLOOM) 0.15-30 MG-MCG tablet 84 tablet 3     Sig: Take 1 tablet by mouth daily       Contraceptives Protocol Failed - 5/6/2021  9:52 AM        Failed - Recent (12 mo) or future (30 days) visit within the authorizing provider's specialty     Patient has had an office visit with the authorizing provider or a provider within the authorizing providers department within the previous 12 mos or has a future within next 30 days. See \"Patient Info\" tab in inbasket, or \"Choose Columns\" in Meds & Orders section of the refill encounter.              Passed - Patient is not a current smoker if age is 35 or older        Passed - Medication is active on med list        Passed - No active pregnancy on record        Passed - No positive pregnancy test in past 12 months         Pt last seen 2/4/2020 for annual exam    Last prescribed 2/4/2020 for 84 tablets with 3 refills.    RN sent mychart reminder to schedule annual exam.    Once scheduled will send hoa refill.    Tyra Gudino RN on 5/6/2021 at 9:57 AM          "

## 2021-05-07 NOTE — TELEPHONE ENCOUNTER
Unable to reach patient via phone. RN left a message and instructed patient to call the clinic at [phone].    Tyra Gudino RN on 5/7/2021 at 10:08 AM

## 2021-05-10 NOTE — TELEPHONE ENCOUNTER
Pt has read [employer] message to schedule appt. RN will give pt a few more days to schedule.    Tyra Gudino RN on 5/10/2021 at 10:15 AM

## 2021-05-13 NOTE — TELEPHONE ENCOUNTER
Pt has read [employer] reminder to schedule annual exam. Pt has not yet scheduled. Unable to reach pt by phone to schedule.    RN routing to provider for advisement on refill.    Tyra Gudino RN on 5/13/2021 at 2:18 PM

## 2021-05-14 RX ORDER — DESOGESTREL AND ETHINYL ESTRADIOL 0.15-0.03
1 KIT ORAL DAILY
Qty: 28 TABLET | Refills: 0 | Status: SHIPPED | OUTPATIENT
Start: 2021-05-14 | End: 2021-07-12

## 2021-07-09 NOTE — PROGRESS NOTES
SUBJECTIVE:   CC: Elzbieta Price is an 21 year old woman who presents for preventive health visit.     {Healthy Habits:     Getting at least 3 servings of Calcium per day:  Yes    Bi-annual eye exam:  Yes    Dental care twice a year:  Yes    Sleep apnea or symptoms of sleep apnea:  None    Diet:  Regular (no restrictions)    Frequency of exercise:  None    Taking medications regularly:  Yes    Medication side effects:  None    PHQ-2 Total Score: 2    Additional concerns today:  No    Currently off oral contraceptive pill, would like to restart. Requests STI screening, no current concerns or known exposure. Recent new relationship.    Today's PHQ-2 Score:   PHQ-2 ( 1999 Pfizer) 7/6/2021   Q1: Little interest or pleasure in doing things 1   Q2: Feeling down, depressed or hopeless 1   PHQ-2 Score 2   Q1: Little interest or pleasure in doing things Several days   Q2: Feeling down, depressed or hopeless Several days   PHQ-2 Score 2       Abuse: Current or Past (Physical, Sexual or Emotional) - No  Do you feel safe in your environment? Yes         Social History     Tobacco Use     Smoking status: Never Smoker     Smokeless tobacco: Never Used   Substance Use Topics     Alcohol use: No         Alcohol Use 7/6/2021   Prescreen: >3 drinks/day or >7 drinks/week? No   Prescreen: >3 drinks/day or >7 drinks/week? -   No flowsheet data found.    Reviewed orders with patient.  Reviewed health maintenance and updated orders accordingly - Yes  Patient Active Problem List   Diagnosis     Irregular menstrual cycle     Past Surgical History:   Procedure Laterality Date     NO HISTORY OF SURGERY         Social History     Tobacco Use     Smoking status: Never Smoker     Smokeless tobacco: Never Used   Substance Use Topics     Alcohol use: No     History reviewed. No pertinent family history.        Breast Cancer Screening:        History of abnormal Pap smear: NO - age 21-29 PAP every 3 years recommended  PAP / HPV 2/4/2020   PAP NIL  "    Reviewed and updated as needed this visit by clinical staff  Tobacco  Allergies  Meds   Med Hx  Surg Hx  Fam Hx  Soc Hx        Reviewed and updated as needed this visit by Provider   Allergies  Meds             Past Medical History:   Diagnosis Date     NO ACTIVE PROBLEMS       Past Surgical History:   Procedure Laterality Date     NO HISTORY OF SURGERY         Review of Systems  CONSTITUTIONAL: NEGATIVE for fever, chills, change in weight  INTEGUMENTARU/SKIN: NEGATIVE for worrisome rashes, moles or lesions  EYES: NEGATIVE for vision changes or irritation  ENT: NEGATIVE for ear, mouth and throat problems  RESP: NEGATIVE for significant cough or SOB  BREAST: NEGATIVE for masses, tenderness or discharge  CV: NEGATIVE for chest pain, palpitations or peripheral edema  GI: NEGATIVE for nausea, abdominal pain, heartburn, or change in bowel habits  : NEGATIVE for unusual urinary or vaginal symptoms. Periods are irregular since stopping oral contraceptive pill.  MUSCULOSKELETAL: NEGATIVE for significant arthralgias or myalgia  NEURO: NEGATIVE for weakness, dizziness or paresthesias  PSYCHIATRIC: NEGATIVE for changes in mood or affect     OBJECTIVE:   /87 (BP Location: Right arm, Patient Position: Sitting, Cuff Size: Adult Regular)   Pulse 79   Temp 98.4  F (36.9  C) (Tympanic)   Ht 1.581 m (5' 2.25\")   Wt 68.9 kg (152 lb)   LMP  (LMP Unknown)   SpO2 98%   BMI 27.58 kg/m    Physical Exam  GENERAL: healthy, alert and no distress  EYES: Eyes grossly normal to inspection, PERRL and conjunctivae and sclerae normal  HENT: ear canals and TM's normal  NECK: no adenopathy, no asymmetry, masses, or scars and thyroid normal to palpation  RESP: lungs clear to auscultation - no rales, rhonchi or wheezes  BREAST: normal without masses, tenderness or nipple discharge and no palpable axillary masses or adenopathy  CV: regular rate and rhythm, normal S1 S2, no S3 or S4, no murmur, click or rub, no peripheral edema " "and peripheral pulses strong  ABDOMEN: soft, nontender, no hepatosplenomegaly, no masses and bowel sounds normal   (female): normal female external genitalia, normal urethral meatus, vaginal mucosa pink, moist, well rugated, and normal cervix/adnexa/uterus without masses or discharge  MS: no gross musculoskeletal defects noted, no edema  SKIN: no suspicious lesions or rashes  NEURO: Normal strength and tone, mentation intact and speech normal  PSYCH: mentation appears normal, affect normal/bright    ASSESSMENT/PLAN:   1. Encounter for gynecological examination without abnormal finding  Pap smear up to date.    2. Encounter for surveillance of contraceptive pills  Discussed options for contraception with patient and she would like to restart oral contraceptive pill. We discussed when to start the pill, taking it at the same time every day, possible side effects she may experience, and use of barrier method to protect against STDs.   - desogestrel-ethinyl estradiol (ISIBLOOM) 0.15-30 MG-MCG tablet; Take 1 tablet by mouth daily  Dispense: 28 tablet; Refill: 3    3. Screening for STDs (sexually transmitted diseases)  - Neisseria gonorrhoeae PCR  - Chlamydia trachomatis PCR  - Hepatitis B surface antigen  - Hepatitis C Screen Reflex to HCV RNA Quant and Genotype  - HIV Antigen Antibody Combo  - Treponema Abs w Reflex to RPR and Titer    COUNSELING:  Reviewed preventive health counseling, as reflected in patient instructions  Special attention given to:        Regular exercise       Healthy diet/nutrition       Contraception       Safe sex practices/STD prevention       Consider Hep C screening for all patients one time for ages 18-79 years    Estimated body mass index is 27.58 kg/m  as calculated from the following:    Height as of this encounter: 1.581 m (5' 2.25\").    Weight as of this encounter: 68.9 kg (152 lb).    Weight management plan: Discussed healthy diet and exercise guidelines    She reports that she has " never smoked. She has never used smokeless tobacco.      Counseling Resources:  ATP IV Guidelines  Pooled Cohorts Equation Calculator  Breast Cancer Risk Calculator  BRCA-Related Cancer Risk Assessment: FHS-7 Tool  FRAX Risk Assessment  ICSI Preventive Guidelines  Dietary Guidelines for Americans, 2010  USDA's MyPlate  ASA Prophylaxis  Lung CA Screening    RHONDA Suero CNP [facility]

## 2021-07-12 ENCOUNTER — OFFICE VISIT (OUTPATIENT)
Dept: OBGYN | Facility: CLINIC | Age: 21
End: 2021-07-12
Payer: COMMERCIAL

## 2021-07-12 VITALS
SYSTOLIC BLOOD PRESSURE: 134 MMHG | BODY MASS INDEX: 27.97 KG/M2 | OXYGEN SATURATION: 98 % | HEIGHT: 62 IN | HEART RATE: 79 BPM | TEMPERATURE: 98.4 F | DIASTOLIC BLOOD PRESSURE: 87 MMHG | WEIGHT: 152 LBS

## 2021-07-12 DIAGNOSIS — Z01.419 ENCOUNTER FOR GYNECOLOGICAL EXAMINATION WITHOUT ABNORMAL FINDING: Primary | ICD-10-CM

## 2021-07-12 DIAGNOSIS — Z11.3 SCREENING FOR STDS (SEXUALLY TRANSMITTED DISEASES): ICD-10-CM

## 2021-07-12 DIAGNOSIS — Z30.41 ENCOUNTER FOR SURVEILLANCE OF CONTRACEPTIVE PILLS: ICD-10-CM

## 2021-07-12 PROCEDURE — 87491 CHLMYD TRACH DNA AMP PROBE: CPT | Performed by: NURSE PRACTITIONER

## 2021-07-12 PROCEDURE — 87340 HEPATITIS B SURFACE AG IA: CPT | Performed by: NURSE PRACTITIONER

## 2021-07-12 PROCEDURE — 87389 HIV-1 AG W/HIV-1&-2 AB AG IA: CPT | Performed by: NURSE PRACTITIONER

## 2021-07-12 PROCEDURE — 99395 PREV VISIT EST AGE 18-39: CPT | Performed by: NURSE PRACTITIONER

## 2021-07-12 PROCEDURE — 36415 COLL VENOUS BLD VENIPUNCTURE: CPT | Performed by: NURSE PRACTITIONER

## 2021-07-12 PROCEDURE — 86803 HEPATITIS C AB TEST: CPT | Performed by: NURSE PRACTITIONER

## 2021-07-12 PROCEDURE — 87591 N.GONORRHOEAE DNA AMP PROB: CPT | Performed by: NURSE PRACTITIONER

## 2021-07-12 PROCEDURE — 86780 TREPONEMA PALLIDUM: CPT | Performed by: NURSE PRACTITIONER

## 2021-07-12 RX ORDER — DESOGESTREL AND ETHINYL ESTRADIOL 0.15-0.03
1 KIT ORAL DAILY
Qty: 28 TABLET | Refills: 3 | Status: SHIPPED | OUTPATIENT
Start: 2021-07-12 | End: 2021-11-01

## 2021-07-12 ASSESSMENT — PAIN SCALES - GENERAL: PAINLEVEL: NO PAIN (0)

## 2021-07-12 ASSESSMENT — MIFFLIN-ST. JEOR: SCORE: 1401.69

## 2021-07-13 LAB
C TRACH DNA SPEC QL NAA+PROBE: NEGATIVE
HBV SURFACE AG SERPL QL IA: NONREACTIVE
HCV AB SERPL QL IA: NONREACTIVE
HIV 1+2 AB+HIV1 P24 AG SERPL QL IA: NONREACTIVE
N GONORRHOEA DNA SPEC QL NAA+PROBE: NEGATIVE
T PALLIDUM AB SER QL: NONREACTIVE

## 2021-10-09 ENCOUNTER — HEALTH MAINTENANCE LETTER (OUTPATIENT)
Age: 21
End: 2021-10-09

## 2021-11-01 DIAGNOSIS — Z30.41 ENCOUNTER FOR SURVEILLANCE OF CONTRACEPTIVE PILLS: ICD-10-CM

## 2021-11-01 RX ORDER — DESOGESTREL AND ETHINYL ESTRADIOL 0.15-0.03
KIT ORAL
Qty: 28 TABLET | Refills: 3 | Status: SHIPPED | OUTPATIENT
Start: 2021-11-01 | End: 2022-04-05

## 2021-11-01 NOTE — TELEPHONE ENCOUNTER
"Requested Prescriptions   Pending Prescriptions Disp Refills     ISIBLOOM 0.15-30 MG-MCG tablet [Pharmacy Med Name: ISIBLOOM 0.15MG-0.03MG TABLETS 28S] 28 tablet 3     Sig: TAKE 1 TABLET BY MOUTH DAILY       Contraceptives Protocol Passed - 11/1/2021 11:13 AM        Passed - Patient is not a current smoker if age is 35 or older        Passed - Recent (12 mo) or future (30 days) visit within the authorizing provider's specialty     Patient has had an office visit with the authorizing provider or a provider within the authorizing providers department within the previous 12 mos or has a future within next 30 days. See \"Patient Info\" tab in inbasket, or \"Choose Columns\" in Meds & Orders section of the refill encounter.              Passed - Medication is active on med list        Passed - No active pregnancy on record        Passed - No positive pregnancy test in past 12 months           Pt last seen 7/12/2021 for annual exam    Last prescribed 7/12/2021 for 28 tablets with 3 refills    Prescription approved per [facility] Refill Protocol.    Tyra Gudino RN on 11/1/2021 at 12:55 PM        "

## 2022-02-11 ENCOUNTER — OFFICE VISIT (OUTPATIENT)
Dept: OBGYN | Facility: CLINIC | Age: 22
End: 2022-02-11
Payer: COMMERCIAL

## 2022-02-11 VITALS
BODY MASS INDEX: 26.39 KG/M2 | DIASTOLIC BLOOD PRESSURE: 86 MMHG | HEIGHT: 62 IN | HEART RATE: 92 BPM | SYSTOLIC BLOOD PRESSURE: 126 MMHG | OXYGEN SATURATION: 97 % | WEIGHT: 143.4 LBS

## 2022-02-11 DIAGNOSIS — L29.2 VULVAR ITCHING: Primary | ICD-10-CM

## 2022-02-11 DIAGNOSIS — B37.31 YEAST INFECTION OF THE VAGINA: ICD-10-CM

## 2022-02-11 LAB
CLUE CELLS: ABNORMAL
TRICHOMONAS, WET PREP: ABNORMAL
WBC'S/HIGH POWER FIELD, WET PREP: ABNORMAL
YEAST, WET PREP: PRESENT

## 2022-02-11 PROCEDURE — 87210 SMEAR WET MOUNT SALINE/INK: CPT | Performed by: NURSE PRACTITIONER

## 2022-02-11 PROCEDURE — 87491 CHLMYD TRACH DNA AMP PROBE: CPT | Performed by: NURSE PRACTITIONER

## 2022-02-11 PROCEDURE — 99213 OFFICE O/P EST LOW 20 MIN: CPT | Performed by: NURSE PRACTITIONER

## 2022-02-11 PROCEDURE — 87591 N.GONORRHOEAE DNA AMP PROB: CPT | Performed by: NURSE PRACTITIONER

## 2022-02-11 RX ORDER — FLUCONAZOLE 150 MG/1
150 TABLET ORAL ONCE
Qty: 2 TABLET | Refills: 0 | Status: SHIPPED | OUTPATIENT
Start: 2022-02-11 | End: 2022-02-11

## 2022-02-11 ASSESSMENT — MIFFLIN-ST. JEOR: SCORE: 1362.68

## 2022-02-11 ASSESSMENT — PAIN SCALES - GENERAL: PAINLEVEL: NO PAIN (0)

## 2022-02-11 NOTE — PROGRESS NOTES
"  Assessment & Plan     Vulvar itching  Await remaining results and treat if indicated  - Wet preparation  - Chlamydia trachomatis PCR  - Neisseria gonorrhoeae PCR    Yeast infection of the vagina  Discussed during visit, possible etiologies of her symptoms. Treat per below and we discussed symptom relief measures she can try. Return to clinic as needed.  - fluconazole (DIFLUCAN) 150 MG tablet; Take 1 tablet (150 mg) by mouth once for 1 dose Repeat in 3 days    RHONDA Suero CNP [facility]    Gerry Ruff is a 21 year old who presents for the following health issues     HPI     Vaginal itching    Occurring episodically since last summer. The initial occurrence lasted about a week and then resolved. Since then, occurs at least monthly, for a few days and then spontaneously resolves. Mostly experiences vulvar itching, near the introitus. Denies much for discharge, odor. No urinary symptoms, pelvic pain, fever, nausea. Symptoms more noticeable right after intercourse, but not always. No changes in products, new partner since last STI screening.     Review of Systems   Constitutional, HEENT, cardiovascular, pulmonary, gi and gu systems are negative, except as otherwise noted.      Objective    /86 (BP Location: Right arm, Patient Position: Sitting, Cuff Size: Adult Regular)   Pulse 92   Ht 1.581 m (5' 2.25\")   Wt 65 kg (143 lb 6.4 oz)   LMP 01/30/2022 (Approximate)   SpO2 97%   BMI 26.02 kg/m    Body mass index is 26.02 kg/m .  Physical Exam   GENERAL: healthy, alert and no distress   (female): normal female external genitalia, normal urethral meatus , vaginal mucosa pink, moist, well rugated and vaginal discharge - scant and white  MS: no gross musculoskeletal defects noted, no edema  SKIN: no suspicious lesions or rashes  PSYCH: mentation appears normal, affect normal/bright    Results for orders placed or performed in visit on 02/11/22 (from the past 24 hour(s)) "   Wet preparation    Specimen: Vagina; Swab   Result Value Ref Range    Trichomonas Absent Absent    Yeast Present (A) Absent    Clue Cells Absent Absent    WBCs/high power field 2+ (A) None

## 2022-02-11 NOTE — PROGRESS NOTES
"  {PROVIDER CHARTING PREFERENCE:662723}    Subjective   Ci is a 21 year old who presents for the following health issues {ACCOMPANIED BY STATEMENT (Optional):177215}    HPI     Vaginal Symptoms  Onset/Duration: ***  Description:  Vaginal Discharge: {.:981182::\"none\"}   Itching (Pruritis): {.:501260::\"no\"}  Burning sensation:  {.:698969::\"no\"}  Odor: {.:664767::\"no\"}  Accompanying Signs & Symptoms:  Urinary symptoms: {.:710366::\"no\"}  Abdominal pain: {.:423959::\"no\"}  Fever: {.:141818::\"no\"}  History:   Sexually active: {.:488509::\"no\"}  New Partner: {.:101166::\"no\"}  Possibility of Pregnancy:  {.:829270::\"no\"}  Recent antibiotic use: {.:771138::\"no\"}  Previous vaginitis issues: {.:281274::\"no\"}  Precipitating or alleviating factors: {NONE DEFAULTED:056622::\"None\"}  Therapies tried and outcome: {VAGINAL TREATMENTS:562178::\"none\"}      {additonal problems for provider to add (Optional):822314}    Review of Systems   {ROS COMP (Optional):570054}      Objective    There were no vitals taken for this visit.  There is no height or weight on file to calculate BMI.  Physical Exam   {Exam List (Optional):426472}    {Diagnostic Test Results (Optional):221428}    {AMBULATORY ATTESTATION (Optional):356419}        "

## 2022-02-12 LAB
C TRACH DNA SPEC QL NAA+PROBE: NEGATIVE
N GONORRHOEA DNA SPEC QL NAA+PROBE: NEGATIVE

## 2022-02-19 NOTE — NURSING NOTE
"Chief Complaint   Patient presents with     Derm Problem     white spots in chest area x 2 years, slowly spreading to left arm pit       Initial /81 mmHg  Pulse 64  Temp(Src) 97.5  F (36.4  C) (Temporal)  Ht 5' 2.5\" (1.588 m)  Wt 118 lb 11.2 oz (53.842 kg)  BMI 21.35 kg/m2  SpO2 100%  LMP 12/25/2016  Breastfeeding? No Estimated body mass index is 21.35 kg/(m^2) as calculated from the following:    Height as of this encounter: 5' 2.5\" (1.588 m).    Weight as of this encounter: 118 lb 11.2 oz (53.842 kg)..  BP completed using cuff size: MARGE Maxwell    " Calm

## 2022-03-17 DIAGNOSIS — B37.31 YEAST INFECTION OF THE VAGINA: ICD-10-CM

## 2022-03-17 NOTE — TELEPHONE ENCOUNTER
Pt was prescribed fluconazole (DIFLUCAN) 150 MG tablet; Take 1 tablet (150 mg) by mouth once for 1 dose Repeat in 3 days on 2/11/22 for a yeast infection by RHONDA Moise CNP.     Sending pt a [employer] message letting her know that if she has symptoms of a yeast infection again she can either use OTC Monistat or be seen in clinic for further evaluation.    Gilma Ferro RN

## 2022-03-18 RX ORDER — FLUCONAZOLE 150 MG/1
TABLET ORAL
OUTPATIENT
Start: 2022-03-18

## 2022-03-18 NOTE — TELEPHONE ENCOUNTER
Routing rx request to RHONDA Moise CNP, to decline as I am not able to.  Pt has read the [email or web address] message sent to her suggesting either using OTC [place] or scheduling an appt.    Gilma Ferro RN

## 2022-04-05 ENCOUNTER — MYC MEDICAL ADVICE (OUTPATIENT)
Dept: OBGYN | Facility: CLINIC | Age: 22
End: 2022-04-05
Payer: COMMERCIAL

## 2022-04-05 DIAGNOSIS — Z30.41 ENCOUNTER FOR SURVEILLANCE OF CONTRACEPTIVE PILLS: ICD-10-CM

## 2022-04-05 RX ORDER — DESOGESTREL AND ETHINYL ESTRADIOL 0.15-0.03
1 KIT ORAL DAILY
Qty: 28 TABLET | Refills: 3 | Status: SHIPPED | OUTPATIENT
Start: 2022-04-05 | End: 2022-04-06

## 2022-04-05 NOTE — TELEPHONE ENCOUNTER
RN reordered prescription and sent remaining refills to preferred pharmacy location per pt request.    Pt aware she is due in July for annual exam/med check in for further refills.    Tyra Gudino RN on 4/5/2022 at 9:01 AM

## 2022-04-06 DIAGNOSIS — Z30.41 ENCOUNTER FOR SURVEILLANCE OF CONTRACEPTIVE PILLS: ICD-10-CM

## 2022-04-06 RX ORDER — DESOGESTREL AND ETHINYL ESTRADIOL 0.15-0.03
KIT ORAL
Qty: 84 TABLET | Refills: 1 | Status: SHIPPED | OUTPATIENT
Start: 2022-04-06 | End: 2022-07-08

## 2022-04-06 NOTE — TELEPHONE ENCOUNTER
"Requested Prescriptions   Pending Prescriptions Disp Refills     desogestrel-ethinyl estradiol (APRI) 0.15-30 MG-MCG tablet [Pharmacy Med Name: DESO/ETH EST TAB 0.15/30]  3     Sig: TAKE 1 TABLET DAILY       Contraceptives Protocol Passed - 4/6/2022  9:49 AM        Passed - Patient is not a current smoker if age is 35 or older        Passed - Recent (12 mo) or future (30 days) visit within the authorizing provider's specialty     Patient has had an office visit with the authorizing provider or a provider within the authorizing providers department within the previous 12 mos or has a future within next 30 days. See \"Patient Info\" tab in inbasket, or \"Choose Columns\" in Meds & Orders section of the refill encounter.              Passed - Medication is active on med list        Passed - No active pregnancy on record        Passed - No positive pregnancy test in past 12 months           Last seen on 2/11/22 for vulvar itching.  Last ordered 4/5/22 for 28 tablets with 3 refills.  Refills remain at pharmacy.  Pharmacy is asking:    Pharmacy comment: May we adjust the quantity to 90 days supply? Please respond with prescription changes.   May we adjust the quantity to 90 days supply? Please respond with prescription changes.    Routing to provider for RX advice.    RUBEN MartinezN RN    "

## 2022-07-10 ASSESSMENT — ENCOUNTER SYMPTOMS
BREAST MASS: 0
HEADACHES: 1

## 2022-07-12 ENCOUNTER — OFFICE VISIT (OUTPATIENT)
Dept: OBGYN | Facility: CLINIC | Age: 22
End: 2022-07-12
Payer: COMMERCIAL

## 2022-07-12 VITALS
OXYGEN SATURATION: 99 % | DIASTOLIC BLOOD PRESSURE: 80 MMHG | WEIGHT: 140.8 LBS | HEIGHT: 62 IN | HEART RATE: 76 BPM | BODY MASS INDEX: 25.91 KG/M2 | SYSTOLIC BLOOD PRESSURE: 124 MMHG

## 2022-07-12 DIAGNOSIS — Z01.419 ENCOUNTER FOR GYNECOLOGICAL EXAMINATION WITHOUT ABNORMAL FINDING: Primary | ICD-10-CM

## 2022-07-12 DIAGNOSIS — Z30.41 ENCOUNTER FOR SURVEILLANCE OF CONTRACEPTIVE PILLS: ICD-10-CM

## 2022-07-12 PROCEDURE — 99395 PREV VISIT EST AGE 18-39: CPT | Performed by: NURSE PRACTITIONER

## 2022-07-12 RX ORDER — DESOGESTREL AND ETHINYL ESTRADIOL 0.15-0.03
1 KIT ORAL DAILY
Qty: 84 TABLET | Refills: 3 | Status: SHIPPED | OUTPATIENT
Start: 2022-07-12 | End: 2023-07-12

## 2022-07-12 ASSESSMENT — PAIN SCALES - GENERAL: PAINLEVEL: NO PAIN (0)

## 2022-07-12 NOTE — PATIENT INSTRUCTIONS
If you have any questions regarding your visit, Please contact your care team.     [facility]: [phone]  To Schedule an Appointment 24/7  Call: [phone]PH[facility]     Lisa Parker- APRN CNP      June Mary-BRYAN Dillard-BRYAN Guthrie-Surgery Scheduler  Irene-Surgery Scheduler         Monday 7:30 am-5:00 pm    Tuesday 8:00 am-4:00 pm    Wednesday 7:30 am-4:00 pm  [place]    Thursday 8:00 am-11:00 am    Friday 7:30 am-4:00 pm [place] [address] [place] [address] [phone] ask for [facility]  [phone] Fax    Imaging Scheduling all locations  [phone]     [facility] Labor and Delivery  [address] [place] [address] Dr.  [place], [place] [id number] [phone]         Urgent Care locations:  [facility]   Monday-Friday  10 am - 8 pm  Saturday and Sunday   9 am - 5 pm     [phone]   If you need a medication refill, please contact your pharmacy. Please allow 3 business days for your refill to be completed.  As always, Thank you for trusting us with your healthcare needs!      see additional instructions from your care team below

## 2022-07-12 NOTE — PROGRESS NOTES
SUBJECTIVE:   CC: Elzbieta Price is an 22 year old woman who presents for preventive health visit.     Healthy Habits:     Getting at least 3 servings of Calcium per day:  Yes    Bi-annual eye exam:  Yes    Dental care twice a year:  NO    Sleep apnea or symptoms of sleep apnea:  None    Diet:  Regular (no restrictions)    Frequency of exercise:  None    Taking medications regularly:  Yes    Medication side effects:  None    PHQ-2 Total Score: 0    Additional concerns today:  No    Doing well on current oral contraceptive pill.     Today's PHQ-2 Score:   PHQ-2 ( 1999 Pfizer) 7/10/2022   Q1: Little interest or pleasure in doing things 0   Q2: Feeling down, depressed or hopeless 0   PHQ-2 Score 0   PHQ-2 Total Score (12-17 Years)- Positive if 3 or more points; Administer PHQ-A if positive -   Q1: Little interest or pleasure in doing things Not at all   Q2: Feeling down, depressed or hopeless Not at all   PHQ-2 Score 0       Abuse: Current or Past (Physical, Sexual or Emotional) - No  Do you feel safe in your environment? Yes        Social History     Tobacco Use     Smoking status: Never Smoker     Smokeless tobacco: Never Used   Substance Use Topics     Alcohol use: No         Alcohol Use 7/10/2022   Prescreen: >3 drinks/day or >7 drinks/week? Yes   Prescreen: >3 drinks/day or >7 drinks/week? -   AUDIT SCORE  14     AUDIT - Alcohol Use Disorders Identification Test - Reproduced from the World Health Organization Audit 2001 (Second Edition) 7/10/2022   1.  How often do you have a drink containing alcohol? 2 to 3 times a week   2.  How many drinks containing alcohol do you have on a typical day when you are drinking? 3 or 4   3.  How often do you have five or more drinks on one occasion? Weekly   4.  How often during the last year have you found that you were not able to stop drinking once you had started? Less than monthly   5.  How often during the last year have you failed to do what was normally expected of you because  of drinking? Monthly   6.  How often during the last year have you needed a first drink in the morning to get yourself going after a heavy drinking session? Less than monthly   7.  How often during the last year have you had a feeling of guilt or remorse after drinking? Less than monthly   8.  How often during the last year have you been unable to remember what happened the night before because of your drinking? Monthly   9.  Have you or someone else been injured because of your drinking? No   10. Has a relative, friend, doctor or other health care worker been concerned about your drinking or suggested you cut down? No   TOTAL SCORE 14       Reviewed orders with patient.  Reviewed health maintenance and updated orders accordingly - Yes  Patient Active Problem List   Diagnosis     Irregular menstrual cycle     Past Surgical History:   Procedure Laterality Date     NO HISTORY OF SURGERY         Social History     Tobacco Use     Smoking status: Never Smoker     Smokeless tobacco: Never Used   Substance Use Topics     Alcohol use: No     History reviewed. No pertinent family history.        History of abnormal Pap smear: NO - age 21-29 PAP every 3 years recommended  PAP / HPV 2/4/2020   PAP (Historical) NIL     Reviewed and updated as needed this visit by clinical staff   Tobacco  Allergies  Meds   Med Hx  Surg Hx  Fam Hx  Soc Hx          Reviewed and updated as needed this visit by Provider                   Past Medical History:   Diagnosis Date     NO ACTIVE PROBLEMS       Past Surgical History:   Procedure Laterality Date     NO HISTORY OF SURGERY         Review of Systems  CONSTITUTIONAL: NEGATIVE for fever, chills, change in weight  INTEGUMENTARU/SKIN: NEGATIVE for worrisome rashes, moles or lesions  EYES: NEGATIVE for vision changes or irritation  ENT: NEGATIVE for ear, mouth and throat problems  RESP: NEGATIVE for significant cough or SOB  BREAST: NEGATIVE for masses, tenderness or discharge  CV: NEGATIVE  "for chest pain, palpitations or peripheral edema  GI: NEGATIVE for nausea, abdominal pain, heartburn, or change in bowel habits  : NEGATIVE for unusual urinary or vaginal symptoms. Periods are regular.  MUSCULOSKELETAL: NEGATIVE for significant arthralgias or myalgia  NEURO: NEGATIVE for weakness, dizziness or paresthesias  PSYCHIATRIC: NEGATIVE for changes in mood or affect     OBJECTIVE:   /80 (BP Location: Right arm, Patient Position: Sitting, Cuff Size: Adult Regular)   Pulse 76   Ht 1.581 m (5' 2.25\")   Wt 63.9 kg (140 lb 12.8 oz)   LMP 06/20/2022 (Approximate)   SpO2 99%   BMI 25.55 kg/m    Physical Exam  GENERAL: healthy, alert and no distress  NECK: no adenopathy, no asymmetry, masses, or scars and thyroid normal to palpation  RESP: lungs clear to auscultation - no rales, rhonchi or wheezes  BREAST: normal without masses, tenderness or nipple discharge and no palpable axillary masses or adenopathy  CV: regular rate and rhythm, normal S1 S2, no S3 or S4, no murmur, click or rub, no peripheral edema and peripheral pulses strong  ABDOMEN: soft, nontender, no hepatosplenomegaly, no masses and bowel sounds normal   (female): normal female external genitalia, normal urethral meatus, vaginal mucosa pink and normal cervix/adnexa/uterus without masses  MS: no gross musculoskeletal defects noted, no edema  SKIN: no suspicious lesions or rashes  NEURO: Normal strength and tone, mentation intact and speech normal  PSYCH: mentation appears normal, affect normal/bright    ASSESSMENT/PLAN:   (Z01.419) Encounter for gynecological examination without abnormal finding  (primary encounter diagnosis)  Comment: Health maintenance reviewed, plan pap smear next year.    (Z30.41) Encounter for surveillance of contraceptive pills  Comment: Refill x 1 year.  Plan: desogestrel-ethinyl estradiol (APRI) 0.15-30         MG-MCG tablet          COUNSELING:  Reviewed preventive health counseling, as reflected in patient " "instructions  Special attention given to:        Regular exercise       Healthy diet/nutrition       Contraception    Estimated body mass index is 25.55 kg/m  as calculated from the following:    Height as of this encounter: 1.581 m (5' 2.25\").    Weight as of this encounter: 63.9 kg (140 lb 12.8 oz).        She reports that she has never smoked. She has never used smokeless tobacco.      Counseling Resources:  ATP IV Guidelines  Pooled Cohorts Equation Calculator  Breast Cancer Risk Calculator  BRCA-Related Cancer Risk Assessment: FHS-7 Tool  FRAX Risk Assessment  ICSI Preventive Guidelines  Dietary Guidelines for Americans, 2010  USDA's MyPlate  ASA Prophylaxis  Lung CA Screening    RHONDA Suero [facility]  "

## 2022-09-11 ENCOUNTER — HEALTH MAINTENANCE LETTER (OUTPATIENT)
Age: 22
End: 2022-09-11

## 2023-05-11 ENCOUNTER — OFFICE VISIT (OUTPATIENT)
Dept: DERMATOLOGY | Facility: CLINIC | Age: 23
End: 2023-05-11
Payer: COMMERCIAL

## 2023-05-11 DIAGNOSIS — L72.8 STEATOCYSTOMA: Primary | ICD-10-CM

## 2023-05-11 PROCEDURE — 99204 OFFICE O/P NEW MOD 45 MIN: CPT | Performed by: DERMATOLOGY

## 2023-05-11 ASSESSMENT — PAIN SCALES - GENERAL: PAINLEVEL: NO PAIN (0)

## 2023-05-11 NOTE — LETTER
5/11/2023         RE: Elzbieta Price  [address] [address] [address]        Dear Colleague,    Thank you for referring your patient, Elzbieta Price, to the [facility]. Please see a copy of my visit note below.    [facility] Dermatology Note  Encounter Date: May 11, 2023  Office Visit     Dermatology Problem List:  1. Steatocystomas - abdomen, eyelid  - Referral placed to oculoplastics  ____________________________________________    Assessment & Plan:    # Steatocystomas - abdomen biopsy proven but new lesions on eyelid possibly same   Reviewed treatment options including laser or punch biopsy. Due to depth of lesions, laser may not be effective. For best results, numerous punch biopsies would need to be performed. Offered biopsy today, pt defers on the abdomen though is agreeable to referral to oculoplastics.  - Referral placed to Dr. Hopper in oculoplastics for evaluation.  -genetic referral  -referral     #eyelid lesion, possibly steatocystome or eruptive vellus hair cysts  -biopsy oculoplastics    Procedures Performed:   None.    Follow-up: with oculoplastics and return to derm yearly with Dr. Ana Abbott    Staff and Scribe:     Scribe Disclosure:   I, Ashish Lynch, am serving as a scribe to document services personally performed by this physician, Dr. Kelly Farias, based on data collection and the provider's statements to me.       Provider Disclosure:   The documentation recorded by the scribe accurately reflects the services I personally performed and the decisions made by me.    Kelly Farias MD    Department of Dermatology  [facility]: Phone: [phone], Fax:[phone]  [facility]: Phone: [phone], Fax: [phone]  ___________________________________    CC: Derm Problem (steatocystomas)    HPI:  Ms. Ruff  Dee is a(n) 23 year old female who presents today as a return patient for a spot check.    Last seen 2017.     Today, she is concerned about the steatocystomas on the abdomen, wondering about treatment options.    Patient is otherwise feeling well, without additional skin concerns.    Labs Reviewed:  N/A    Physical Exam:  Vitals: There were no vitals taken for this visit.  SKIN: Focused examination of abdomen was performed.  - papules eyelids, skin colored about 1mm, about 14-approximately 2 dozen yellow papules chest, normal fingernails  - No other lesions of concern on areas examined.     Medications:  Current Outpatient Medications   Medication     desogestrel-ethinyl estradiol (APRI) 0.15-30 MG-MCG tablet     No current facility-administered medications for this visit.      Past Medical History:   Patient Active Problem List   Diagnosis     Irregular menstrual cycle     Past Medical History:   Diagnosis Date     NO ACTIVE PROBLEMS         CC Referred Self, MD  No address on file on close of this encounter.     Again, thank you for allowing me to participate in the care of your patient.        Sincerely,        Kelly Farias MD

## 2023-05-11 NOTE — PROGRESS NOTES
[facility] Dermatology Note  Encounter Date: May 11, 2023  Office Visit     Dermatology Problem List:  1. Steatocystomas - abdomen, eyelid  - Referral placed to oculoplastics  ____________________________________________    Assessment & Plan:    # Steatocystomas - abdomen biopsy proven but new lesions on eyelid possibly same   Reviewed treatment options including laser or punch biopsy. Due to depth of lesions, laser may not be effective. For best results, numerous punch biopsies would need to be performed. Offered biopsy today, pt defers on the abdomen though is agreeable to referral to oculoplastics.  - Referral placed to Dr. Hopper in oculoplastics for evaluation.  -genetic referral  -referral     #eyelid lesion, possibly steatocystome or eruptive vellus hair cysts  -biopsy oculoplastics    Procedures Performed:   None.    Follow-up: with oculoplastics and return to derm yearly with Dr. Ana Abbott    Staff and Scribe:     Scribe Disclosure:   I, Ashish Lynch, am serving as a scribe to document services personally performed by this physician, Dr. Kelly Farias, based on data collection and the provider's statements to me.       Provider Disclosure:   The documentation recorded by the scribe accurately reflects the services I personally performed and the decisions made by me.    Kelly Farias MD    Department of Dermatology  [facility]: Phone: [phone], Fax:[phone]  [facility]: Phone: [phone], Fax: [phone]  ___________________________________    CC: Derm Problem (steatocystomas)    HPI:  Ms. Elzbieta Price is a(n) 23 year old female who presents today as a return patient for a spot check.    Last seen 2017.     Today, she is concerned about the steatocystomas on the abdomen, wondering about treatment options.    Patient is otherwise feeling  well, without additional skin concerns.    Labs Reviewed:  N/A    Physical Exam:  Vitals: There were no vitals taken for this visit.  SKIN: Focused examination of abdomen was performed.  - papules eyelids, skin colored about 1mm, about 14-approximately 2 dozen yellow papules chest, normal fingernails  - No other lesions of concern on areas examined.     Medications:  Current Outpatient Medications   Medication     desogestrel-ethinyl estradiol (APRI) 0.15-30 MG-MCG tablet     No current facility-administered medications for this visit.      Past Medical History:   Patient Active Problem List   Diagnosis     Irregular menstrual cycle     Past Medical History:   Diagnosis Date     NO ACTIVE PROBLEMS         CC Referred Self, MD  No address on file on close of this encounter.

## 2023-05-11 NOTE — NURSING NOTE
Elzbieta Price's chief complaint for this visit includes:  Chief Complaint   Patient presents with     Derm Problem     steatocystomas     PCP: [place] [facility]    Referring Provider:  Referred Self, MD  No address on file    There were no vitals taken for this visit.  No Pain (0)      No Known Allergies      Do you need any medication refills at today's visit?    No

## 2023-05-11 NOTE — PATIENT INSTRUCTIONS
"  Board Certifications  American Board of Ophthalmology  Fellowships  [facility], [facility], [place], [place]  Residency  [employer]  Medical [employer]  [employer]  Recognition  [employer] [place] \"Top Doctor - Rising Star\" (2016, 2017, 2018)  [facility]  Top Doctors  ([id number])  Guide to [place] [employer] Top Ophthalmologists  [facility] Department of Ophthalmology Fellow Research Award  Remy Dent Award for Clinical Skills, Compassion, and Patient Care  Locations  [facility]   For Appointments [phone] at [facility] to schedule at [place]                  "

## 2023-05-12 ENCOUNTER — TELEPHONE (OUTPATIENT)
Dept: CONSULT | Facility: CLINIC | Age: 23
End: 2023-05-12
Payer: COMMERCIAL

## 2023-07-12 ENCOUNTER — MYC MEDICAL ADVICE (OUTPATIENT)
Dept: OBGYN | Facility: CLINIC | Age: 23
End: 2023-07-12
Payer: COMMERCIAL

## 2023-07-12 DIAGNOSIS — Z30.41 ENCOUNTER FOR SURVEILLANCE OF CONTRACEPTIVE PILLS: Primary | ICD-10-CM

## 2023-07-12 RX ORDER — DESOGESTREL AND ETHINYL ESTRADIOL 0.15-0.03
1 KIT ORAL DAILY
Qty: 28 TABLET | Refills: 0 | Status: SHIPPED | OUTPATIENT
Start: 2023-07-12 | End: 2023-08-04

## 2023-07-12 NOTE — TELEPHONE ENCOUNTER
Requested Prescriptions   Pending Prescriptions Disp Refills     desogestrel-ethinyl estradiol (APRI) 0.15-30 MG-MCG tablet 84 tablet 3     Sig: Take 1 tablet by mouth daily       There is no refill protocol information for this order        Last seen: 7/12/2022  Last prescribed: 7/12/2022 for 84 tabs with 3 refills    Satorist message sent with appt reminder.

## 2023-08-03 DIAGNOSIS — Z30.41 ENCOUNTER FOR SURVEILLANCE OF CONTRACEPTIVE PILLS: ICD-10-CM

## 2023-08-04 RX ORDER — DESOGESTREL AND ETHINYL ESTRADIOL 0.15-0.03
1 KIT ORAL DAILY
Qty: 28 TABLET | Refills: 0 | Status: SHIPPED | OUTPATIENT
Start: 2023-08-04

## 2023-08-04 ASSESSMENT — ENCOUNTER SYMPTOMS
DYSURIA: 0
BREAST MASS: 0
CONSTIPATION: 0
PARESTHESIAS: 0
SORE THROAT: 0
NERVOUS/ANXIOUS: 0
HEARTBURN: 0
NAUSEA: 0
CHILLS: 0
JOINT SWELLING: 0
EYE PAIN: 0
PALPITATIONS: 0
ARTHRALGIAS: 0
HEADACHES: 0
WEAKNESS: 0
FEVER: 0
HEMATURIA: 0
SHORTNESS OF BREATH: 0
FREQUENCY: 0
ABDOMINAL PAIN: 0
DIZZINESS: 0
MYALGIAS: 0
DIARRHEA: 0
COUGH: 0
HEMATOCHEZIA: 0

## 2023-08-07 ENCOUNTER — OFFICE VISIT (OUTPATIENT)
Dept: OBGYN | Facility: CLINIC | Age: 23
End: 2023-08-07
Payer: COMMERCIAL

## 2023-08-07 VITALS
HEIGHT: 62 IN | DIASTOLIC BLOOD PRESSURE: 84 MMHG | SYSTOLIC BLOOD PRESSURE: 127 MMHG | OXYGEN SATURATION: 99 % | BODY MASS INDEX: 26.91 KG/M2 | HEART RATE: 67 BPM | WEIGHT: 146.2 LBS

## 2023-08-07 DIAGNOSIS — Z01.419 ENCOUNTER FOR GYNECOLOGICAL EXAMINATION WITHOUT ABNORMAL FINDING: Primary | ICD-10-CM

## 2023-08-07 DIAGNOSIS — Z11.3 SCREEN FOR STD (SEXUALLY TRANSMITTED DISEASE): ICD-10-CM

## 2023-08-07 DIAGNOSIS — Z30.41 ENCOUNTER FOR SURVEILLANCE OF CONTRACEPTIVE PILLS: ICD-10-CM

## 2023-08-07 LAB
C TRACH DNA SPEC QL NAA+PROBE: NEGATIVE
N GONORRHOEA DNA SPEC QL NAA+PROBE: NEGATIVE

## 2023-08-07 PROCEDURE — G0145 SCR C/V CYTO,THINLAYER,RESCR: HCPCS | Performed by: NURSE PRACTITIONER

## 2023-08-07 PROCEDURE — 87491 CHLMYD TRACH DNA AMP PROBE: CPT | Performed by: NURSE PRACTITIONER

## 2023-08-07 PROCEDURE — 87591 N.GONORRHOEAE DNA AMP PROB: CPT | Performed by: NURSE PRACTITIONER

## 2023-08-07 PROCEDURE — 99395 PREV VISIT EST AGE 18-39: CPT | Performed by: NURSE PRACTITIONER

## 2023-08-07 RX ORDER — DESOGESTREL AND ETHINYL ESTRADIOL 0.15-0.03
1 KIT ORAL DAILY
Qty: 84 TABLET | Refills: 3 | Status: SHIPPED | OUTPATIENT
Start: 2023-08-07

## 2023-08-07 NOTE — PROGRESS NOTES
SUBJECTIVE:   CC: Ci is an 23 year old who presents for preventive health visit.     Healthy Habits:     Getting at least 3 servings of Calcium per day:  Yes    Bi-annual eye exam:  Yes    Dental care twice a year:  NO    Sleep apnea or symptoms of sleep apnea:  None    Diet:  Regular (no restrictions)    Frequency of exercise:  None    Taking medications regularly:  Yes    Medication side effects:  None    Additional concerns today:  Yes      Today's PHQ-2 Score:       8/4/2023     8:20 AM   PHQ-2 ( 1999 Pfizer)   Q1: Little interest or pleasure in doing things 0   Q2: Feeling down, depressed or hopeless 0   PHQ-2 Score 0   Q1: Little interest or pleasure in doing things Not at all   Q2: Feeling down, depressed or hopeless Not at all   PHQ-2 Score 0       Social History     Tobacco Use    Smoking status: Never    Smokeless tobacco: Never   Substance Use Topics    Alcohol use: No           8/4/2023     8:19 AM   Alcohol Use   Prescreen: >3 drinks/day or >7 drinks/week? No     Reviewed orders with patient.  Reviewed health maintenance and updated orders accordingly - Yes  Patient Active Problem List   Diagnosis    Irregular menstrual cycle     Past Surgical History:   Procedure Laterality Date    NO HISTORY OF SURGERY         Social History     Tobacco Use    Smoking status: Never    Smokeless tobacco: Never   Substance Use Topics    Alcohol use: No     History reviewed. No pertinent family history.        Breast Cancer Screening:    Patient under 40 years of age: Routine Mammogram Screening not recommended.   Pertinent mammograms are reviewed under the imaging tab.    History of abnormal Pap smear: NO - age 21-29 PAP every 3 years recommended      2/4/2020     3:06 PM   PAP / HPV   PAP (Historical) NIL      Reviewed and updated as needed this visit by clinical staff   Tobacco  Allergies  Meds   Med Hx  Surg Hx  Fam Hx  Soc Hx        Reviewed and updated as needed this visit by Provider                 Past  "Medical History:   Diagnosis Date    NO ACTIVE PROBLEMS       Past Surgical History:   Procedure Laterality Date    NO HISTORY OF SURGERY       CONSTITUTIONAL: NEGATIVE for fever, chills, change in weight  INTEGUMENTARU/SKIN: NEGATIVE for worrisome rashes, moles or lesions  EYES: NEGATIVE for vision changes or irritation  ENT: NEGATIVE for ear, mouth and throat problems  RESP: NEGATIVE for significant cough or SOB  BREAST: NEGATIVE for masses, tenderness or discharge  CV: NEGATIVE for chest pain, palpitations or peripheral edema  GI: NEGATIVE for nausea, abdominal pain, heartburn, or change in bowel habits  : NEGATIVE for unusual urinary or vaginal symptoms. Periods are regular.  MUSCULOSKELETAL: NEGATIVE for significant arthralgias or myalgia  NEURO: NEGATIVE for weakness, dizziness or paresthesias  PSYCHIATRIC: NEGATIVE for changes in mood or affect     OBJECTIVE:   /84 (BP Location: Right arm, Patient Position: Sitting, Cuff Size: Adult Regular)   Pulse 67   Ht 1.581 m (5' 2.25\")   Wt 66.3 kg (146 lb 3.2 oz)   LMP 07/24/2023 (Approximate)   SpO2 99%   BMI 26.53 kg/m    Physical Exam  GENERAL: healthy, alert and no distress  NECK: no adenopathy, no asymmetry, masses, or scars and thyroid normal to palpation  RESP: lungs clear to auscultation - no rales, rhonchi or wheezes  BREAST: normal without masses, tenderness or nipple discharge and no palpable axillary masses or adenopathy  CV: regular rate and rhythm, normal S1 S2, no S3 or S4, no murmur, click or rub, no peripheral edema and peripheral pulses strong  ABDOMEN: soft, nontender, no hepatosplenomegaly, no masses and bowel sounds normal   (female): normal female external genitalia, normal urethral meatus, vaginal mucosa pink, moist, well rugated, and normal cervix/adnexa/uterus without masses or discharge  MS: no gross musculoskeletal defects noted, no edema  SKIN: no suspicious lesions or rashes  NEURO: Normal strength and tone, mentation " intact and speech normal  PSYCH: mentation appears normal, affect normal/bright    ASSESSMENT/PLAN:   (Z01.419) Encounter for gynecological examination without abnormal finding  (primary encounter diagnosis)  Plan: Pap Screen reflex to HPV if ASCUS - recommend         age 25 - 29     (Z30.41) Encounter for surveillance of contraceptive pills  Comment: Refill x 1 year  Plan: desogestrel-ethinyl estradiol (APRI) 0.15-30         MG-MCG tablet       (Z11.3) Screen for STD (sexually transmitted disease)  Plan: Chlamydia trachomatis PCR, Neisseria         gonorrhoeae PCR         COUNSELING:  Reviewed preventive health counseling, as reflected in patient instructions  Special attention given to:        Regular exercise       Healthy diet/nutrition       Contraception       Safe sex practices/STD prevention      She reports that she has never smoked. She has never used smokeless tobacco.          RHONDA Suero [facility]

## 2023-08-07 NOTE — PATIENT INSTRUCTIONS
If you have any questions regarding your visit, Please contact your care team.     [facility]: [phone]  To Schedule an Appointment 24/7  Call: [phone]     Lisa Parker- APRN CNP      June Jaime-Surgery Scheduler  Irene-Surgery Scheduler         Monday 7:30am-2:00pm    Tuesday 7:30am-4:00pm    Wednesday 7:30am-2:00pm    Thursday 7:30am-11:00am    Friday 7:30am-2:00pm [place] [address] [place] [address] [phone] ask for [facility]  [phone] Fax    Imaging Scheduling all locations  [phone]    [facility]  [address] [place] [address] Dr.  [place], [place] [id number] [phone]         Urgent Care locations:  [facility]   Monday-Friday  10 am - 8 pm  Saturday and Sunday   9 am - 5 pm     [phone]   If you need a medication refill, please contact your pharmacy. Please allow 3 business days for your refill to be completed.  As always, Thank you for trusting us with your healthcare needs!      see additional instructions from your care team below

## 2023-08-09 LAB
BKR LAB AP GYN ADEQUACY: NORMAL
BKR LAB AP GYN INTERPRETATION: NORMAL
BKR LAB AP HPV REFLEX: NORMAL
BKR LAB AP PREVIOUS ABNORMAL: NORMAL
PATH REPORT.COMMENTS IMP SPEC: NORMAL
PATH REPORT.COMMENTS IMP SPEC: NORMAL
PATH REPORT.RELEVANT HX SPEC: NORMAL

## 2024-02-28 ENCOUNTER — OFFICE VISIT (OUTPATIENT)
Dept: OPHTHALMOLOGY | Facility: CLINIC | Age: 24
End: 2024-02-28
Attending: DERMATOLOGY
Payer: COMMERCIAL

## 2024-02-28 DIAGNOSIS — H02.9 EYELID LESION: Primary | ICD-10-CM

## 2024-02-28 PROCEDURE — 99203 OFFICE O/P NEW LOW 30 MIN: CPT | Performed by: OPHTHALMOLOGY

## 2024-02-28 ASSESSMENT — TONOMETRY
OS_IOP_MMHG: 23
IOP_METHOD: ICARE
OD_IOP_MMHG: 22

## 2024-02-28 ASSESSMENT — VISUAL ACUITY
METHOD: SNELLEN - LINEAR
CORRECTION_TYPE: CONTACTS
OS_CC: 20/30
OD_CC: 20/25

## 2024-02-28 NOTE — PROGRESS NOTES
Chief Complaint(s) and History of Present Illness(es)     Steatocystoma           Comments    Here for steatocystoma x2-3 years - worsening. VA doing fine. No pain.    Nikhil Price COT 8:49 AM February 28, 2024     Referred by Dr. Farias for eyelid lesions.      Numerous skin lesions have developed both lower eyelids and left upper eyelid over the past several years. No bleeding, itching, etc...     She has a history of a biopsy from her abdomen in the past that returned as steatocytoma. Derm note comments these may be similar pathology versus eruptive vellus hair cysts.     Assessment & Plan     Elzbieta Price is a 23 year old female with the following diagnoses:   Encounter Diagnoses   Name Primary?    Steatocystoma     Eyelid lesion Yes     We discussed options. Excisional biopsy is reasonable. Will provide quote for excision, and would send at least left lower eyelid to pathology.    We did discuss the trade off for excision of these lesions would be scar. There is also a risk of recurrence.     She would like to return to have this done.      Attending Physician Attestation: Complete documentation of historical and exam elements from today's encounter can be found in the full encounter summary report (not reduplicated in this progress note). I personally obtained the chief complaint(s) and history of present illness. I confirmed and edited as necessary the review of systems, past medical/surgical history, family history, social history, and examination findings as documented by others; and I examined the patient myself. I personally reviewed the relevant tests, images, and reports as documented above. I formulated and edited as necessary the assessment and plan and discussed the findings and management plan with the patient.  -Aminah Hopper MD

## 2024-02-28 NOTE — NURSING NOTE
Chief Complaints and History of Present Illnesses   Patient presents with    Steatocystoma     Chief Complaint(s) and History of Present Illness(es)       Steatocystoma               Comments    Here for steatocystoma x2-3 years - worsening. VA doing fine. No pain.    Nikhil Price COT 8:49 AM February 28, 2024

## 2024-02-28 NOTE — LETTER
2024         RE:  :  MRN: Elzbieta Price  2000  [id number]     Dear Dr. Kelly Farias,    Thank you for asking me to see your patient, Elzbieta Price, for an oculoplastic   consultation.  My assessment and plan are below.  For further details, please see my attached clinic note.      Chief Complaint(s) and History of Present Illness(es)     Steatocystoma           Comments    Here for steatocystoma x2-3 years - worsening. VA doing fine. No pain.    Nikhil Price COT 8:49 AM 2024     Referred by Dr. Farias for eyelid lesions.      Numerous skin lesions have developed both lower eyelids and left upper eyelid over the past several years. No bleeding, itching, etc...     She has a history of a biopsy from her abdomen in the past that returned as steatocytoma. Derm note comments these may be similar pathology versus eruptive vellus hair cysts.     Assessment & Plan     Elzbieta Price is a 23 year old female with the following diagnoses:   Encounter Diagnoses   Name Primary?     Steatocystoma      Eyelid lesion Yes     We discussed options. Excisional biopsy is reasonable. Will provide quote for excision, and would send at least left lower eyelid to pathology.    We did discuss the trade off for excision of these lesions would be scar. There is also a risk of recurrence.     She would like to return to have this done.       Again, thank you for allowing me to participate in the care of your patient.      Sincerely,    Aminah Hopper MD  Department of Ophthalmology and Visual Neurosciences  [facility]    CC: Kelly Farias MD  [address]  [facility] [address]  Via In Basket

## 2024-03-13 ENCOUNTER — OFFICE VISIT (OUTPATIENT)
Dept: OPHTHALMOLOGY | Facility: CLINIC | Age: 24
End: 2024-03-13

## 2024-03-13 DIAGNOSIS — H02.9 EYELID LESION: Primary | ICD-10-CM

## 2024-03-13 PROCEDURE — 88305 TISSUE EXAM BY PATHOLOGIST: CPT | Performed by: OPHTHALMOLOGY

## 2024-03-13 PROCEDURE — 96999 UNLISTED SPEC DERM SVC/PX: CPT | Performed by: OPHTHALMOLOGY

## 2024-03-13 RX ORDER — ERYTHROMYCIN 5 MG/G
OINTMENT OPHTHALMIC ONCE
Status: COMPLETED | OUTPATIENT
Start: 2024-03-13 | End: 2024-03-13

## 2024-03-13 RX ADMIN — ERYTHROMYCIN: 5 OINTMENT OPHTHALMIC at 12:22

## 2024-03-13 NOTE — PROGRESS NOTES
Self-pay procedure note    Elzbieta Price has numerous upper and lower eyelid lesions.  We discussed risks benefits and alternatives to excision of these lesions and she elected to proceed.  With a mirror I had her Markle all of the lesions she desired to have excised.  Topical anesthetic was initially administered followed by 1% lidocaine with epinephrine was injected subcutaneously adjacent to each of the lesions.  She was prepped and draped in the typical sterile fashion.  Each of these lesions was elevated with a toothed forceps and excised at its base with John scissors.  Hemostasis was assured.  A larger lesion was present on the right upper eyelid which was excised and the defect closed with a 6-0 plain gut suture.  A total of 14 lesions were excised.  The left lower eyelid lesions were sent to pathology in formalin.  I was present for the entire procedure. Aminah Hopper MD

## 2024-03-13 NOTE — LETTER
"March 15, 2024       Dee  [id number]   [place] [address]        Dear Elzbieta,    Please see below for your test results. The lesions are consistent with syringomas. The lesion was benign (not worrisome). As long as you are healing well, no further care should be necessary for the area. You can continue to apply the prescribed ointment, or Vaseline or Aquaphor to the area until it has healed nicely. If you have any concerns or recurrence, please return for further evaluation.       Resulted Orders   Surgical Pathology Exam   Result Value Ref Range    Case Report       Surgical Pathology Report                         Case: [id number]                                  Authorizing Provider:  Aminah Hopper MD      Collected:           03/13/2024 12:21 PM          Ordering Location:     [facility]   Received:            03/13/2024 04:40 PM                                 [place]                                                                  Pathologist:           Bettie Arnold MD                                                         Specimen:    Eyelid, Lower, Left                                                                        Final Diagnosis       Lower eyelid, left, lesion excision: Findings most consistent with syringoma.       Clinical Information       The patient is a 23 year old female with history of steatocystoma and multiple eyelid lesions. She undergoes excision of the lesions on the left.       Gross Description       A(A). Eyelid, Lower, Left, Eyelid, Lower, Left:  The specimen is received in formalin with proper patient identification, labeled \"eyelid, lower, left\".  The specimen consists of 4 pieces of pale-tan skin, 0.1-0.2 cm.  The subcutaneous surfaces are inked black.  The specimen is wrapped and submitted entirely in A1.       Microscopic Description       The tissue consists of skin with well-circumscribed islands of epithelial nests and cords in the " dermis, some in a tadpole shape and forming ductules with proteinaceous fluid or keratin debris in the lumen.      Performing Labs       The technical component of this testing was completed at [facility]      Case Images         If you have any questions, please call the clinic to make an appointment.    Sincerely,    Aminah Hopper MD    Oculoplastic and Orbital Surgery   Department of Ophthalmology and Visual Neurosciences  [facility]

## 2024-03-15 LAB
PATH REPORT.COMMENTS IMP SPEC: NORMAL
PATH REPORT.COMMENTS IMP SPEC: NORMAL
PATH REPORT.FINAL DX SPEC: NORMAL
PATH REPORT.GROSS SPEC: NORMAL
PATH REPORT.MICROSCOPIC SPEC OTHER STN: NORMAL
PATH REPORT.RELEVANT HX SPEC: NORMAL
PHOTO IMAGE: NORMAL

## 2024-09-21 ENCOUNTER — HEALTH MAINTENANCE LETTER (OUTPATIENT)
Age: 24
End: 2024-09-21